# Patient Record
Sex: FEMALE | Race: BLACK OR AFRICAN AMERICAN | Employment: FULL TIME | URBAN - METROPOLITAN AREA
[De-identification: names, ages, dates, MRNs, and addresses within clinical notes are randomized per-mention and may not be internally consistent; named-entity substitution may affect disease eponyms.]

---

## 2018-07-03 LAB
HBSAG, EXTERNAL: NEGATIVE
HCT, EXTERNAL: 39.6
HGB, EXTERNAL: 13.6
HIV, EXTERNAL: NORMAL
PLATELET CNT,   EXTERNAL: 262
RPR, EXTERNAL: NEGATIVE
RPR, EXTERNAL: REACTIVE
RUBELLA, EXTERNAL: NORMAL
T. PALLIDUM, EXTERNAL: NEGATIVE
TYPE, ABO & RH, EXTERNAL: NORMAL

## 2018-11-12 ENCOUNTER — ROUTINE PRENATAL (OUTPATIENT)
Dept: OBGYN CLINIC | Age: 23
End: 2018-11-12

## 2018-11-12 VITALS
SYSTOLIC BLOOD PRESSURE: 110 MMHG | TEMPERATURE: 96.9 F | DIASTOLIC BLOOD PRESSURE: 68 MMHG | HEART RATE: 55 BPM | OXYGEN SATURATION: 100 % | BODY MASS INDEX: 26.09 KG/M2 | RESPIRATION RATE: 18 BRPM | HEIGHT: 62 IN | WEIGHT: 141.8 LBS

## 2018-11-12 DIAGNOSIS — Z34.03 ENCOUNTER FOR SUPERVISION OF NORMAL FIRST PREGNANCY IN THIRD TRIMESTER: ICD-10-CM

## 2018-11-12 DIAGNOSIS — Z3A.32 32 WEEKS GESTATION OF PREGNANCY: ICD-10-CM

## 2018-11-12 DIAGNOSIS — Z34.83 ENCOUNTER FOR SUPERVISION OF NORMAL PREGNANCY IN MULTIGRAVIDA IN THIRD TRIMESTER: Primary | ICD-10-CM

## 2018-11-12 NOTE — PROGRESS NOTES
32w4d  No c/o  Transfer of care from [address] [place] [address] rec requested, not yet received  Labs/US done at [facility]  No 1 hr GTT yet per pt  H/o 1 TNSVD in [place], daughter age 1  1 hr GTT today  Med rec requested. Will review labs, US, and dating when received.   RTO 2 wks

## 2018-11-12 NOTE — PATIENT INSTRUCTIONS
Weeks 32 to 34 of Your Pregnancy: Care Instructions  Your Care Instructions    During the last few weeks of your pregnancy, you may have more aches and pains. It's important to rest when you can. Your growing baby is putting more pressure on your bladder. So you may need to urinate more often. Hemorrhoids are also common. These are painful, itchy veins in the rectal area. In the 36th week, most women have a test for group B streptococcus (GBS). GBS is a common bacteria that can live in the vagina and rectum. It can make your baby sick after birth. If you test positive, you will get antibiotics during labor. These will keep your baby from getting the bacteria. You may want to talk with your doctor about banking your baby's umbilical cord blood. This is the blood left in the cord after birth. If you want to save this blood, you must arrange it ahead of time. You can't decide at the last minute. If you haven't already had the Tdap shot during this pregnancy, talk to your doctor about getting it. It will help protect your  against pertussis infection. Follow-up care is a key part of your treatment and safety. Be sure to make and go to all appointments, and call your doctor if you are having problems. It's also a good idea to know your test results and keep a list of the medicines you take. How can you care for yourself at home? Ease hemorrhoids  · Get more liquids, fruits, vegetables, and fiber in your diet. This will help keep your stools soft. · Avoid sitting for too long. Lie on your left side several times a day. · Clean yourself with soft, moist toilet paper. Or you can use witch hazel pads or personal hygiene pads. · If you are uncomfortable, try ice packs. Or you can sit in a warm sitz bath. Do these for 20 minutes at a time, as needed. · Use hydrocortisone cream for pain and itching. Two examples are Anusol and Preparation H Hydrocortisone.   · Ask your doctor about taking an over-the-counter stool softener. Consider breastfeeding  · Experts recommend that women breastfeed for 1 year or longer. Breast milk is the perfect food for babies. · Breast milk is easier for babies to digest than formula. And it is always available, just the right temperature, and free. · Breast milk may help protect your child from some health problems.  babies are less likely than formula-fed babies to:  ? Get ear infections, colds, diarrhea, and pneumonia. ? Be obese or get diabetes later in life. · Women who breastfeed have less bleeding after the birth. Their uteruses also shrink back faster. · Some women who breastfeed lose weight faster. Making milk burns calories. · Breastfeeding can lower your risk of breast cancer, ovarian cancer, and osteoporosis. Decide about circumcision for boys  · As you make this decision, it may help to think about your personal, Jehovah's witness, and family traditions. You get to decide if you will keep your son's penis natural or if he will be circumcised. · If you decide that you would like to have your baby circumcised, talk with your doctor. You can share your concerns about pain. And you can discuss your preferences for anesthesia. Where can you learn more? Go to http://ronni-bryanna.info/. Enter O804 in the search box to learn more about \"Weeks 32 to 34 of Your Pregnancy: Care Instructions. \"  Current as of: November 21, 2017  Content Version: 11.8  © 8274-5909 Healthwise, Incorporated. Care instructions adapted under license by [employer] (which disclaims liability or warranty for this information). If you have questions about a medical condition or this instruction, always ask your healthcare professional. [place] [address] any warranty or liability for your use of this information.

## 2018-11-13 LAB
ERYTHROCYTE [DISTWIDTH] IN BLOOD BY AUTOMATED COUNT: 14.5 % (ref 12.3–15.4)
GLUCOSE 1H P 50 G GLC PO SERPL-MCNC: 93 MG/DL (ref 65–139)
HCT VFR BLD AUTO: 34.7 % (ref 34–46.6)
HGB BLD-MCNC: 11.1 G/DL (ref 11.1–15.9)
MCH RBC QN AUTO: 28.2 PG (ref 26.6–33)
MCHC RBC AUTO-ENTMCNC: 32 G/DL (ref 31.5–35.7)
MCV RBC AUTO: 88 FL (ref 79–97)
PLATELET # BLD AUTO: 175 X10E3/UL (ref 150–379)
RBC # BLD AUTO: 3.93 X10E6/UL (ref 3.77–5.28)
WBC # BLD AUTO: 6.4 X10E3/UL (ref 3.4–10.8)

## 2018-12-04 ENCOUNTER — ROUTINE PRENATAL (OUTPATIENT)
Dept: OBGYN CLINIC | Age: 23
End: 2018-12-04

## 2018-12-04 VITALS
HEIGHT: 62 IN | BODY MASS INDEX: 26.39 KG/M2 | WEIGHT: 143.4 LBS | TEMPERATURE: 98.2 F | RESPIRATION RATE: 18 BRPM | SYSTOLIC BLOOD PRESSURE: 132 MMHG | OXYGEN SATURATION: 100 % | HEART RATE: 90 BPM | DIASTOLIC BLOOD PRESSURE: 76 MMHG

## 2018-12-04 DIAGNOSIS — Z34.83 ENCOUNTER FOR SUPERVISION OF OTHER NORMAL PREGNANCY IN THIRD TRIMESTER: Primary | ICD-10-CM

## 2018-12-04 NOTE — PATIENT INSTRUCTIONS

## 2018-12-04 NOTE — PROGRESS NOTES
32w4d  No c/o  Denies ctx/VB/LOF. +FM. Desires no epidural. She had her daughter without an epidural and was in labor at hospital for 4 hours. She presented in spontaneous labor at 2 cm. Labs/US done at [facility]  1 hr GTT=93  H/o 1 TNSVD in [place], daughter age 3. Weighed over 6 lbs. Pt feels baby is bigger with this pregnancy. Pt declines GBBS/CX SVE today. Pt advised of indication for GBS. Plan for Cx at next OV and SVE. Confirm presentation at next OV.

## 2018-12-11 ENCOUNTER — ROUTINE PRENATAL (OUTPATIENT)
Dept: OBGYN CLINIC | Age: 23
End: 2018-12-11

## 2018-12-11 VITALS
HEIGHT: 62 IN | TEMPERATURE: 99 F | BODY MASS INDEX: 26.91 KG/M2 | SYSTOLIC BLOOD PRESSURE: 116 MMHG | WEIGHT: 146.2 LBS | RESPIRATION RATE: 18 BRPM | DIASTOLIC BLOOD PRESSURE: 74 MMHG | HEART RATE: 107 BPM | OXYGEN SATURATION: 99 %

## 2018-12-11 DIAGNOSIS — L30.9 ECZEMA, UNSPECIFIED TYPE: ICD-10-CM

## 2018-12-11 DIAGNOSIS — Z34.83 PRENATAL CARE, SUBSEQUENT PREGNANCY IN THIRD TRIMESTER: Primary | ICD-10-CM

## 2018-12-11 LAB — GRBS, EXTERNAL: NEGATIVE

## 2018-12-11 NOTE — PROGRESS NOTES
36.5 Weeks  Dated by   No obstetric complaints  Severe eczema on hands, arms, legs, and abdomen-->refer to dermatology (pt call her dermatologist). Also discussed benadryl cream and emollients.   See flow sheet  Tdap vaccine not done  Reviewed labs and imaging  1 hour GTTwnl , Rh+  GBS/GC/Chl/TV today  PTL discussed and kick counts  RTC 1 weeks

## 2018-12-11 NOTE — PATIENT INSTRUCTIONS
Weeks 34 to 36 of Your Pregnancy: Care Instructions  Your Care Instructions    By now, your baby and your belly have grown quite large. It is almost time to give birth. A full-term pregnancy can deliver between 37 and 42 weeks. Your baby's lungs are almost ready to breathe air. The bones in your baby's head are now firm enough to protect it, but soft enough to move down through the birth canal.  You may feel excited, happy, anxious, or scared. You may wonder how you will know if you are in labor or what to expect during labor. Try to be flexible in your expectations of the birth. Because each birth is different, there is no way to know exactly what childbirth will be like for you. This care sheet will help you know what to expect and how to prepare. This may make your childbirth easier. If you haven't already had the Tdap shot during this pregnancy, talk to your doctor about getting it. It will help protect your  against pertussis infection. In the 36th week, most women have a test for group B streptococcus (GBS). GBS is a common bacteria that can live in the vagina and rectum. It can make your baby sick after birth. If you test positive, you will get antibiotics during labor. The medicine will keep your baby from getting the bacteria. Follow-up care is a key part of your treatment and safety. Be sure to make and go to all appointments, and call your doctor if you are having problems. It's also a good idea to know your test results and keep a list of the medicines you take. How can you care for yourself at home? Learn about pain relief choices  · Pain is different for every woman. Talk with your doctor about your feelings about pain. · You can choose from several types of pain relief. These include medicine or breathing techniques, as well as comfort measures. You can use more than one option. · If you choose to have pain medicine during labor, talk to your doctor about your options.  Some medicines lower anxiety and help with some of the pain. Others make your lower body numb so that you won't feel pain. · Be sure to tell your doctor about your pain medicine choice before you start labor or very early in your labor. You may be able to change your mind as labor progresses. · Rarely, a woman is put to sleep by medicine given through a mask or an IV. Labor and delivery  · The first stage of labor has three parts: early, active, and transition. ? Most women have early labor at home. You can stay busy or rest, eat light snacks, drink clear fluids, and start counting contractions. ? When talking during a contraction gets hard, you may be moving to active labor. During active labor, you should head for the hospital if you are not there already. ? You are in active labor when contractions come every 3 to 4 minutes and last about 60 seconds. Your cervix is opening more rapidly. ? If your water breaks, contractions will come faster and stronger. ? During transition, your cervix is stretching, and contractions are coming more rapidly. ? You may want to push, but your cervix might not be ready. Your doctor will tell you when to push. · The second stage starts when your cervix is completely opened and you are ready to push. ? Contractions are very strong to push the baby down the birth canal.  ? You will feel the urge to push. You may feel like you need to have a bowel movement. ? You may be coached to push with contractions. These contractions will be very strong, but you will not have them as often. You can get a little rest between contractions. ? You may be emotional and irritable. You may not be aware of what is going on around you.  ? One last push, and your baby is born. · The third stage is when a few more contractions push out the placenta. This may take 30 minutes or less. · The fourth stage is the welcome recovery. You may feel overwhelmed with emotions and exhausted but alert.  This is a good time to start breastfeeding. Where can you learn more? Go to http://ronni-bryanna.info/. Enter K721 in the search box to learn more about \"Weeks 34 to 36 of Your Pregnancy: Care Instructions. \"  Current as of: November 21, 2017  Content Version: 11.8  © 6763-6400 [employer]. Care instructions adapted under license by [employer] (which disclaims liability or warranty for this information). If you have questions about a medical condition or this instruction, always ask your healthcare professional. [address] any warranty or liability for your use of this information. Learning About When to Call Your Doctor During Pregnancy (After 20 Weeks)  Your Care Instructions  It's common to have concerns about what might be a problem during pregnancy. Although most pregnant women don't have any serious problems, it's important to know when to call your doctor if you have certain symptoms or signs of labor. These are general suggestions. Your doctor may give you some more information about when to call. When to call your doctor (after 20 weeks)  Call 911 anytime you think you may need emergency care. For example, call if:  · You have severe vaginal bleeding. · You have sudden, severe pain in your belly. · You passed out (lost consciousness). · You have a seizure. · You see or feel the umbilical cord. · You think you are about to deliver your baby and can't make it safely to the hospital.  Call your doctor now or seek immediate medical care if:  · You have vaginal bleeding. · You have belly pain. · You have a fever. · You have symptoms of preeclampsia, such as:  ? Sudden swelling of your face, hands, or feet. ? New vision problems (such as dimness or blurring). ? A severe headache. · You have a sudden release of fluid from your vagina. (You think your water broke.)  · You think that you may be in labor.  This means that you've had at least 4 contractions within 20 minutes or at least 8 contractions in an hour. · You notice that your baby has stopped moving or is moving much less than normal.  · You have symptoms of a urinary tract infection. These may include:  ? Pain or burning when you urinate. ? A frequent need to urinate without being able to pass much urine. ? Pain in the flank, which is just below the rib cage and above the waist on either side of the back. ? Blood in your urine. Watch closely for changes in your health, and be sure to contact your doctor if:  · You have vaginal discharge that smells bad. · You have skin changes, such as:  ? A rash. ? Itching. ? Yellow color to your skin. · You have other concerns about your pregnancy. If you have labor signs at 37 weeks or more  If you have signs of labor at 37 weeks or more, your doctor may tell you to call when your labor becomes more active. Symptoms of active labor include:  · Contractions that are regular. · Contractions that are less than 5 minutes apart. · Contractions that are hard to talk through. Follow-up care is a key part of your treatment and safety. Be sure to make and go to all appointments, and call your doctor if you are having problems. It's also a good idea to know your test results and keep a list of the medicines you take. Where can you learn more? Go to http://ronni-bryanna.info/. Enter  in the search box to learn more about \"Learning About When to Call Your Doctor During Pregnancy (After 20 Weeks). \"  Current as of: November 21, 2017  Content Version: 11.8  © 8630-1429 Healthwise, Incorporated. Care instructions adapted under license by [employer] (which disclaims liability or warranty for this information). If you have questions about a medical condition or this instruction, always ask your healthcare professional. [place] [address] any warranty or liability for your use of this information. Counting Your Baby's Kicks: Care Instructions  Your Care Instructions    Counting your baby's kicks is one way your doctor can tell that your baby is healthy. Most women--especially in a first pregnancy--feel their baby move for the first time between 16 and 22 weeks. The movement may feel like flutters rather than kicks. Your baby may move more at certain times of the day. When you are active, you may notice less kicking than when you are resting. At your prenatal visits, your doctor will ask whether the baby is active. In your last trimester, your doctor may ask you to count the number of times you feel your baby move. Follow-up care is a key part of your treatment and safety. Be sure to make and go to all appointments, and call your doctor if you are having problems. It's also a good idea to know your test results and keep a list of the medicines you take. How do you count fetal kicks? · A common method of checking your baby's movement is to count the number of kicks or moves you feel in 1 hour. Ten movements (such as kicks, flutters, or rolls) in 1 hour are normal. Some doctors suggest that you count in the morning until you get to 10 movements. Then you can quit for that day and start again the next day. · Pick your baby's most active time of day to count. This may be any time from morning to evening. · If you do not feel 10 movements in an hour, your baby may be sleeping. Wait for the next hour and count again. When should you call for help? Call your doctor now or seek immediate medical care if:    · You noticed that your baby has stopped moving or is moving much less than normal.    Watch closely for changes in your health, and be sure to contact your doctor if you have any problems. Where can you learn more? Go to http://ronni-bryanna.info/. Enter J902 in the search box to learn more about \"Counting Your Baby's Kicks: Care Instructions. \"  Current as of: November 21, 2017  Content Version: 11.8  © 7983-1306 Healthwise, Incorporated. Care instructions adapted under license by [employer] (which disclaims liability or warranty for this information). If you have questions about a medical condition or this instruction, always ask your healthcare professional. [address] any warranty or liability for your use of this information.

## 2018-12-15 LAB
B-HEM STREP SPEC QL CULT: NEGATIVE
C TRACH RRNA SPEC QL NAA+PROBE: NEGATIVE
N GONORRHOEA RRNA SPEC QL NAA+PROBE: NEGATIVE
T VAGINALIS RRNA VAG QL NAA+PROBE: NEGATIVE

## 2018-12-16 ENCOUNTER — HOSPITAL ENCOUNTER (INPATIENT)
Age: 23
LOS: 1 days | Discharge: HOME | DRG: 560 | End: 2018-12-17
Attending: OBSTETRICS & GYNECOLOGY | Admitting: OBSTETRICS & GYNECOLOGY
Payer: MEDICAID

## 2018-12-16 LAB
ABO + RH BLD: NORMAL
APPEARANCE UR: CLEAR
BASOPHILS # BLD: 0 K/UL (ref 0–0.1)
BASOPHILS NFR BLD: 0 % (ref 0–2)
BILIRUB UR QL: NEGATIVE
BLOOD GROUP ANTIBODIES SERPL: NORMAL
COLOR UR: ABNORMAL
DIFFERENTIAL METHOD BLD: ABNORMAL
EOSINOPHIL # BLD: 0.3 K/UL (ref 0–0.4)
EOSINOPHIL NFR BLD: 6 % (ref 0–5)
ERYTHROCYTE [DISTWIDTH] IN BLOOD BY AUTOMATED COUNT: 14.8 % (ref 11.6–14.5)
GLUCOSE UR QL STRIP.AUTO: NEGATIVE MG/DL
HCT VFR BLD AUTO: 35.5 % (ref 35–45)
HGB BLD-MCNC: 11.7 G/DL (ref 12–16)
KETONES UR-MCNC: >160 MG/DL
LEUKOCYTE ESTERASE UR QL STRIP: NEGATIVE
LYMPHOCYTES # BLD: 1.7 K/UL (ref 0.9–3.6)
LYMPHOCYTES NFR BLD: 29 % (ref 21–52)
MCH RBC QN AUTO: 27.4 PG (ref 24–34)
MCHC RBC AUTO-ENTMCNC: 33 G/DL (ref 31–37)
MCV RBC AUTO: 83.1 FL (ref 74–97)
MONOCYTES # BLD: 0.4 K/UL (ref 0.05–1.2)
MONOCYTES NFR BLD: 7 % (ref 3–10)
NEUTS SEG # BLD: 3.4 K/UL (ref 1.8–8)
NEUTS SEG NFR BLD: 58 % (ref 40–73)
NITRITE UR QL: NEGATIVE
PH UR: 6.5 [PH] (ref 5–9)
PLATELET # BLD AUTO: 190 K/UL (ref 135–420)
PMV BLD AUTO: 12.4 FL (ref 9.2–11.8)
PROT UR QL: 100 MG/DL
RBC # BLD AUTO: 4.27 M/UL (ref 4.2–5.3)
RBC # UR STRIP: NEGATIVE /UL
SERVICE CMNT-IMP: ABNORMAL
SP GR UR: 1.02 (ref 1–1.02)
SPECIMEN EXP DATE BLD: NORMAL
UROBILINOGEN UR QL: 2 EU/DL (ref 0.2–1)
WBC # BLD AUTO: 5.7 K/UL (ref 4.6–13.2)

## 2018-12-16 PROCEDURE — 74011250636 HC RX REV CODE- 250/636: Performed by: OBSTETRICS & GYNECOLOGY

## 2018-12-16 PROCEDURE — 75410000003 HC RECOV DEL/VAG/CSECN EA 0.5 HR

## 2018-12-16 PROCEDURE — 59025 FETAL NON-STRESS TEST: CPT

## 2018-12-16 PROCEDURE — 65270000029 HC RM PRIVATE

## 2018-12-16 PROCEDURE — 74011250637 HC RX REV CODE- 250/637: Performed by: OBSTETRICS & GYNECOLOGY

## 2018-12-16 PROCEDURE — 85025 COMPLETE CBC W/AUTO DIFF WBC: CPT

## 2018-12-16 PROCEDURE — 86901 BLOOD TYPING SEROLOGIC RH(D): CPT

## 2018-12-16 PROCEDURE — 75410000002 HC LABOR FEE PER 1 HR

## 2018-12-16 PROCEDURE — 81003 URINALYSIS AUTO W/O SCOPE: CPT

## 2018-12-16 PROCEDURE — 59020 FETAL CONTRACT STRESS TEST: CPT

## 2018-12-16 PROCEDURE — 99282 EMERGENCY DEPT VISIT SF MDM: CPT

## 2018-12-16 PROCEDURE — 75410000000 HC DELIVERY VAGINAL/SINGLE

## 2018-12-16 RX ORDER — ACETAMINOPHEN 325 MG/1
650 TABLET ORAL
Status: DISCONTINUED | OUTPATIENT
Start: 2018-12-16 | End: 2018-12-17 | Stop reason: HOSPADM

## 2018-12-16 RX ORDER — HYDROMORPHONE HYDROCHLORIDE 1 MG/ML
1 INJECTION, SOLUTION INTRAMUSCULAR; INTRAVENOUS; SUBCUTANEOUS
Status: DISCONTINUED | OUTPATIENT
Start: 2018-12-16 | End: 2018-12-16 | Stop reason: HOSPADM

## 2018-12-16 RX ORDER — MINERAL OIL
30 OIL (ML) ORAL AS NEEDED
Status: DISCONTINUED | OUTPATIENT
Start: 2018-12-16 | End: 2018-12-16 | Stop reason: HOSPADM

## 2018-12-16 RX ORDER — LIDOCAINE HYDROCHLORIDE 10 MG/ML
20 INJECTION, SOLUTION EPIDURAL; INFILTRATION; INTRACAUDAL; PERINEURAL AS NEEDED
Status: DISCONTINUED | OUTPATIENT
Start: 2018-12-16 | End: 2018-12-16 | Stop reason: HOSPADM

## 2018-12-16 RX ORDER — PROMETHAZINE HYDROCHLORIDE 25 MG/ML
25 INJECTION, SOLUTION INTRAMUSCULAR; INTRAVENOUS
Status: DISCONTINUED | OUTPATIENT
Start: 2018-12-16 | End: 2018-12-17 | Stop reason: HOSPADM

## 2018-12-16 RX ORDER — OXYCODONE AND ACETAMINOPHEN 5; 325 MG/1; MG/1
1 TABLET ORAL
Status: DISCONTINUED | OUTPATIENT
Start: 2018-12-16 | End: 2018-12-17 | Stop reason: HOSPADM

## 2018-12-16 RX ORDER — OXYTOCIN/RINGER'S LACTATE 20/1000 ML
125 PLASTIC BAG, INJECTION (ML) INTRAVENOUS CONTINUOUS
Status: DISCONTINUED | OUTPATIENT
Start: 2018-12-16 | End: 2018-12-16 | Stop reason: HOSPADM

## 2018-12-16 RX ORDER — AMOXICILLIN 250 MG
1 CAPSULE ORAL
Status: DISCONTINUED | OUTPATIENT
Start: 2018-12-16 | End: 2018-12-17 | Stop reason: HOSPADM

## 2018-12-16 RX ORDER — OXYTOCIN/RINGER'S LACTATE 20/1000 ML
999 PLASTIC BAG, INJECTION (ML) INTRAVENOUS ONCE
Status: DISCONTINUED | OUTPATIENT
Start: 2018-12-16 | End: 2018-12-16 | Stop reason: HOSPADM

## 2018-12-16 RX ORDER — HYDROCORTISONE 25 MG/G
CREAM TOPICAL AS NEEDED
Status: DISCONTINUED | OUTPATIENT
Start: 2018-12-16 | End: 2018-12-17 | Stop reason: HOSPADM

## 2018-12-16 RX ORDER — TERBUTALINE SULFATE 1 MG/ML
0.25 INJECTION SUBCUTANEOUS
Status: DISCONTINUED | OUTPATIENT
Start: 2018-12-16 | End: 2018-12-16 | Stop reason: HOSPADM

## 2018-12-16 RX ORDER — SODIUM CHLORIDE, SODIUM LACTATE, POTASSIUM CHLORIDE, CALCIUM CHLORIDE 600; 310; 30; 20 MG/100ML; MG/100ML; MG/100ML; MG/100ML
125 INJECTION, SOLUTION INTRAVENOUS CONTINUOUS
Status: DISCONTINUED | OUTPATIENT
Start: 2018-12-16 | End: 2018-12-16 | Stop reason: HOSPADM

## 2018-12-16 RX ORDER — METHYLERGONOVINE MALEATE 0.2 MG/ML
0.2 INJECTION INTRAVENOUS AS NEEDED
Status: DISCONTINUED | OUTPATIENT
Start: 2018-12-16 | End: 2018-12-16 | Stop reason: HOSPADM

## 2018-12-16 RX ORDER — IBUPROFEN 400 MG/1
800 TABLET ORAL 3 TIMES DAILY
Status: DISCONTINUED | OUTPATIENT
Start: 2018-12-16 | End: 2018-12-17 | Stop reason: HOSPADM

## 2018-12-16 RX ORDER — BUTORPHANOL TARTRATE 1 MG/ML
2 INJECTION INTRAMUSCULAR; INTRAVENOUS
Status: DISCONTINUED | OUTPATIENT
Start: 2018-12-16 | End: 2018-12-16 | Stop reason: HOSPADM

## 2018-12-16 RX ORDER — NALBUPHINE HYDROCHLORIDE 10 MG/ML
10 INJECTION, SOLUTION INTRAMUSCULAR; INTRAVENOUS; SUBCUTANEOUS
Status: DISCONTINUED | OUTPATIENT
Start: 2018-12-16 | End: 2018-12-16 | Stop reason: HOSPADM

## 2018-12-16 RX ADMIN — IBUPROFEN 800 MG: 400 TABLET, FILM COATED ORAL at 08:04

## 2018-12-16 RX ADMIN — IBUPROFEN 800 MG: 400 TABLET, FILM COATED ORAL at 22:20

## 2018-12-16 RX ADMIN — SODIUM CHLORIDE, SODIUM LACTATE, POTASSIUM CHLORIDE, AND CALCIUM CHLORIDE 500 ML: 600; 310; 30; 20 INJECTION, SOLUTION INTRAVENOUS at 04:25

## 2018-12-16 RX ADMIN — OXYCODONE AND ACETAMINOPHEN 1 TABLET: 5; 325 TABLET ORAL at 20:28

## 2018-12-16 RX ADMIN — IBUPROFEN 800 MG: 400 TABLET, FILM COATED ORAL at 15:26

## 2018-12-16 NOTE — LACTATION NOTE
This note was copied from a baby's chart. Mother is rooming in with baby. States that she is going to breastfeed x 3 months. She denies questions or concerns. Lactation and staff will offer assistance at any time.

## 2018-12-16 NOTE — PROGRESS NOTES
1143 TRANSFER - OUT REPORT:    Verbal report given to J LUIS Kwan RN on Joan Parrish  being transferred to [facility] for routine progression of care       Report consisted of patients Situation, Background, Assessment and   Recommendations(SBAR). Information from the following report(s) SBAR, Intake/Output, MAR and Recent Results was reviewed with the receiving nurse. Lines:   Peripheral IV 12/16/18 Anterior;Right Wrist (Active)        Opportunity for questions and clarification was provided.       Patient transported with:   Registered Nurse

## 2018-12-16 NOTE — ROUTINE PROCESS
1142: Bedside and Verbal shift change report given to J LUIS Kwan (Postpartum nurse) by VARINDER Taylor (Labor/Delivery  nurse). Report included the following information SBAR.     1150: Introduction to Pt, Discussed care plan, Pt verbalizes understanding of care plan. Safety issues check. Pt denies needs or assistance at this time. Assessment completed, pt up in bed visiting with family/friends at bedside. Pt denies pain or further assistance needed at this time.  sleeping in crib at bedside    1230: Pt up in bathroom, family at bedside,  sleeping in crib at bedside. Pt denies pain or further assistance needed at this time    1315: Pt up in bed resting,  in crib at bedside, family/friend at bedside. Pt denies pain or further assistance needed at this time. Photography in room    1435: Pt up in bed resting,  being held by family/friend at mothers bedside. Pt reported slight pain related to cramping, but denies pain medication until she can have motrin again. Pt denies further assistance needed at this time    1526: Pt up in ed with  in lap. Pt stated pain 8/10 related to cramping, pain medication given. Will continue to monitor and assess pt. Pt denies further assistance needed at this time. Family/friend at bedside    1613: Pt in bed resting/sleeping,  in crib at bedside, family/friend at side. Pt stated decrease in pain to a now 5/10, pt denies further assistance needed at this time    1700: Hourly rounding completed with Dr. Jazmine Durham. Pt denies pain or further assistance needed at this time     1808: Pt up in bed holding , stated getting ready to nurse , family/friends at bedside. Pt denies pain or further assistance needed at this time    1900: Bedside and Verbal shift change report given to DAMON Casas (oncoming nurse) by J LUIS Kwan (offgoing nurse). Report included the following information SBAR.

## 2018-12-16 NOTE — L&D DELIVERY NOTE
Delivery Summary    Patient: Luis Lynch MRN: [id number]  SSN: [id number]    YOB: 1995  Age: 26 y.o. Sex: female       Information for the patient's :  Jaclynsharron Hooper [[id number]]       Labor Events:    Labor: No   Rupture Date: 2018   Rupture Time: 4:46 AM   Rupture Type SROM   Amniotic Fluid Volume: None    Amniotic Fluid Description: Clear None   Induction: None       Augmentation: None   Labor Events: None     Cervical Ripening:           Delivery Events:  Episiotomy: None   Laceration(s): None     Repaired:      Number of Repair Packets:     Suture Type and Size:       Estimated Blood Loss (ml): 200ml       Delivery Date: 2018    Delivery Time: 5:56 AM  Delivery Type: Vaginal, Spontaneous  Sex:  Male     Gestational Age: 44w3d   Delivery Clinician:  Sourav Balderas  Living Status: Living   Delivery Location: L&D            APGARS  One minute Five minutes Ten minutes   Skin color: 0   1        Heart rate: 2   2        Grimace: 2   2        Muscle tone: 2   2        Breathin   2        Totals: 8   9            Presentation: Vertex    Position: Right Occiput Anterior  Resuscitation Method:  Suctioning-bulb; Tactile Stimulation     Meconium Stained: None      Cord Information: 3 Vessels  Complications: Nuchal Cord Without Compressions  Cord around:    Delayed cord clamping? Yes  Cord clamped date/time:2018  5:57 AM  Disposition of Cord Blood: Lab    Blood Gases Sent?: No    Placenta:  Date/Time: 2018  6:00 AM  Removal: Spontaneous      Appearance: Normal     [place] Measurements:  Birth Weight: 3.42 kg      Birth Length: 53.3 cm      Head Circumference: 32.5 cm      Chest Circumference: 31.5 cm     Abdominal Girth: 33.5 cm    Other Providers:   AMEYA VANN;ESPERANZA HUBER;NINA FORBES;FERNY KIRKPATRICK;LEA DELGADILLO, Obstetrician;Primary Nurse;Primary  Nurse;Scrub Tech;Staff Nurse; Anesthesiologist;Crna;Nurse Practitioner;Midwife;Nursery Nurse           Group B Strep:   Lab Results   Component Value Date/Time    GrBStrep, External Negative 2018     Information for the patient's :  Roxannlola Douglas [[id number]]   No results found for: ABORH, PCTABR, PCTDIG, BILI, ABORHEXT, ABORH    No results for input(s): PCO2CB, PO2CB, HCO3I, SO2I, IBD, PTEMPI, SPECTI, PHICB, ISITE, IDEV, IALLEN in the last 72 hours.

## 2018-12-16 NOTE — PROGRESS NOTES
5823 - Pt is a  at 37w3d, arrived by wheelchair, for rule out labor. She notes contractions increasing in pain and frequency since 0200 this morning. complians of ctxs, denies  VB, denies LOF, positve FM. Her prenatal course has been uneventful with WBOB. Champ and EFM placed. Cervical exam is /0 BBOW. Admission process started. 4435 - Patient complains of urge to push. SVE /0 with SROM. MD notified and on route. 65 -  of viable male infant. Nursery nurse at bedside. Infant placed on towel on patient's abdomen. Towel dried and stimulated, cry heard shortly thereafter. Cord clamped and cut. Infant placed skin to skin with mom. Pitocin started.

## 2018-12-16 NOTE — L&D DELIVERY NOTE
History & Physical    Name: Geovanny Collins MRN: [id number]  SSN: [id number]    YOB: 1995  Age: 26 y.o. Sex: female        Subjective:     Estimated Date of Delivery: 1/3/19  OB History    Para Term  AB Living   2 2 2     1   SAB TAB Ectopic Molar Multiple Live Births           0 1      # Outcome Date GA Lbr Wayne/2nd Weight Sex Delivery Anes PTL Lv   2 Term 18 37w3d 03:50 / 00:06 3.42 kg M Vag-Spont None N FRANCISCO   1 Term 17     Vag-Spont             Ms. Corine Sue is admitted with pregnancy at 44w3d in labor. active labor. Prenatal course was normal. Please see prenatal records for details. Past Medical History:   Diagnosis Date    Eczema      History reviewed. No pertinent surgical history. Social History     Occupational History    Not on file   Tobacco Use    Smoking status: Former Smoker     Last attempt to quit: 2018     Years since quittin.7    Smokeless tobacco: Never Used   Substance and Sexual Activity    Alcohol use: No     Frequency: Never    Drug use: No    Sexual activity: Not Currently     History reviewed. No pertinent family history. No Known Allergies  Prior to Admission medications    Medication Sig Start Date End Date Taking? Authorizing Provider   PNV66-Iron Fumarate-FA-DSS-DHA 26-1.2- mg cap Take  by mouth. Yes Provider, Historical        Review of Systems: A comprehensive review of systems was negative. Objective:     Vitals:  Vitals:    18 0645 18 0700 18 0715 18 0730   BP: 138/86 130/80 139/77 129/82   Pulse: (!) 112 98 93 99   Resp:    18   Temp:    98.9 °F (37.2 °C)   Weight:       Height:            Physical Exam:  Patient without distress.   Heart: Regular rate and rhythm, S1S2 present or without murmur or extra heart sounds  Back: costovertebral angle tenderness absent  Abdomen: soft, nontender  Fundus: soft and non tender  Perineum: blood absent, amniotic fluid absent  Cervical Exam: 6 cm dilated    80% effaced    -3 station    Presenting Part: cephalic  Lower Extremities:  - No evidence of DVT seen on physical exam.  Membranes:  Premature Rupture of Membranes; Amniotic Fluid: clear fluid  Fetal Heart Rate: Reactive  Baseline: 150 per minute  Accelerations: yes    Prenatal Labs:   Lab Results   Component Value Date/Time    ABO/Rh(D) B POSITIVE 12/16/2018 04:00 AM    GrBStrep, External Negative 12/11/2018         Assessment/Plan:     Active Problems:    Labor without complication (90/86/2415)         Plan: Admit for Continue plan for vaginal delivery. Group B Strep was negative.     Signed By:  Huber Muñoz MD     December 16, 2018

## 2018-12-17 VITALS
OXYGEN SATURATION: 99 % | HEIGHT: 62 IN | HEART RATE: 82 BPM | RESPIRATION RATE: 16 BRPM | WEIGHT: 145 LBS | BODY MASS INDEX: 26.68 KG/M2 | SYSTOLIC BLOOD PRESSURE: 118 MMHG | TEMPERATURE: 98.2 F | DIASTOLIC BLOOD PRESSURE: 78 MMHG

## 2018-12-17 PROCEDURE — 74011250637 HC RX REV CODE- 250/637: Performed by: OBSTETRICS & GYNECOLOGY

## 2018-12-17 RX ORDER — IBUPROFEN 800 MG/1
800 TABLET ORAL 3 TIMES DAILY
Qty: 60 TAB | Refills: 1 | Status: ON HOLD | OUTPATIENT
Start: 2018-12-17 | End: 2020-07-13 | Stop reason: CLARIF

## 2018-12-17 RX ORDER — OXYCODONE AND ACETAMINOPHEN 5; 325 MG/1; MG/1
1 TABLET ORAL
Qty: 6 TAB | Refills: 0 | Status: ON HOLD | OUTPATIENT
Start: 2018-12-17 | End: 2020-07-13 | Stop reason: CLARIF

## 2018-12-17 RX ADMIN — IBUPROFEN 800 MG: 400 TABLET, FILM COATED ORAL at 08:36

## 2018-12-17 RX ADMIN — OXYCODONE AND ACETAMINOPHEN 1 TABLET: 5; 325 TABLET ORAL at 02:22

## 2018-12-17 RX ADMIN — OXYCODONE AND ACETAMINOPHEN 1 TABLET: 5; 325 TABLET ORAL at 08:36

## 2018-12-17 NOTE — DISCHARGE INSTRUCTIONS
After Your Delivery (the Postpartum Period): Care Instructions  Your Care Instructions    Congratulations on the birth of your baby. Like pregnancy, the  period can be a time of excitement, benita, and exhaustion. You may look at your wondrous little baby and feel happy. You may also be overwhelmed by your new sleep hours and new responsibilities. At first, babies often sleep during the days and are awake at night. They do not have a pattern or routine. They may make sudden gasps, jerk themselves awake, or look like they have crossed eyes. These are all normal, and they may even make you smile. In these first weeks after delivery, try to take good care of yourself. It may take 4 to 6 weeks to feel like yourself again, and possibly longer if you had a  birth. You will likely feel very tired for several weeks. Your days will be full of ups and downs, but lots of benita as well. Follow-up care is a key part of your treatment and safety. Be sure to make and go to all appointments, and call your doctor if you are having problems. It's also a good idea to know your test results and keep a list of the medicines you take. How can you care for yourself at home? Take care of your body after delivery  · Use pads instead of tampons for the bloody flow that may last as long as 2 weeks. · Ease cramps with ibuprofen (Advil, Motrin). · Ease soreness of hemorrhoids and the area between your vagina and rectum with ice compresses or witch hazel pads. · Ease constipation by drinking lots of fluid and eating high-fiber foods. Ask your doctor about over-the-counter stool softeners. · Cleanse yourself with a gentle squeeze of warm water from a bottle instead of wiping with toilet paper. · Take a sitz bath in warm water several times a day. · Wear a good nursing bra. Ease sore and swollen breasts with warm, wet washcloths. · If you are not breastfeeding, use ice rather than heat for breast soreness.   · Your period may not start for several months if you are breastfeeding. You may bleed more, and longer at first, than you did before you got pregnant. · Wait until you are healed (about 4 to 6 weeks) before you have sexual intercourse. Your doctor will tell you when it is okay to have sex. · Try not to travel with your baby for 5 or 6 weeks. If you take a long car trip, make frequent stops to walk around and stretch. Avoid exhaustion  · Rest every day. Try to nap when your baby naps. · Ask another adult to be with you for a few days after delivery. · Plan for  if you have other children. · Stay flexible so you can eat at odd hours and sleep when you need to. Both you and your baby are making new schedules. · Plan small trips to get out of the house. Change can make you feel less tired. · Ask for help with housework, cooking, and shopping. Remind yourself that your job is to care for your baby. Know about help for postpartum depression  · \"Baby blues\" are common for the first 1 to 2 weeks after birth. You may cry or feel sad or irritable for no reason. · Rest whenever you can. Being tired makes it harder to handle your emotions. · Go for walks with your baby. · Talk to your partner, friends, and family about your feelings. · If your symptoms last for more than a few weeks, or if you feel very depressed, ask your doctor for help. · Postpartum depression can be treated. Support groups and counseling can help. Sometimes medicine can also help. Stay healthy  · Eat healthy foods so you have more energy and lose extra baby pounds. · If you breastfeed, avoid drugs. If you quit smoking during pregnancy, try to stay smoke-free. If you choose to have a drink now and then, have only one drink, and limit the number of occasions that you have a drink. Wait to breastfeed at least 2 hours after you have a drink to reduce the amount of alcohol the baby may get in the milk.   · Start daily exercise after 4 to 6 weeks, but rest when you feel tired. · Learn exercises to tone your belly. Do Kegel exercises to regain strength in your pelvic muscles. You can do these exercises while you stand or sit. ? Squeeze the same muscles you would use to stop your urine. Your belly and thighs should not move. ? Hold the squeeze for 3 seconds, and then relax for 3 seconds. ? Start with 3 seconds. Then add 1 second each week until you are able to squeeze for 10 seconds. ? Repeat the exercise 10 to 15 times for each session. Do three or more sessions each day. · Find a class for new mothers and new babies that has an exercise time. · If you had a  birth, give yourself a bit more time before you exercise, and be careful. When should you call for help? Call 911 anytime you think you may need emergency care. For example, call if:    · You passed out (lost consciousness).    Call your doctor now or seek immediate medical care if:    · You have severe vaginal bleeding. This means you are passing blood clots and soaking through a pad each hour for 2 or more hours.     · You are dizzy or lightheaded, or you feel like you may faint.     · You have a fever.     · You have new belly pain, or your pain gets worse.    Watch closely for changes in your health, and be sure to contact your doctor if:    · Your vaginal bleeding seems to be getting heavier.     · You have new or worse vaginal discharge.     · You feel sad, anxious, or hopeless for more than a few days.     · You do not get better as expected. Where can you learn more? Go to http://ronni-bryanna.info/. Enter A461 in the search box to learn more about \"After Your Delivery (the Postpartum Period): Care Instructions. \"  Current as of: 2017  Content Version: 11.8  © 8862-0033 [employer]. Care instructions adapted under license by [employer] (which disclaims liability or warranty for this information).  If you have questions about a medical condition or this instruction, always ask your healthcare professional. [place] [address] any warranty or liability for your use of this information.

## 2018-12-17 NOTE — PROGRESS NOTES
Verbal and bedside report received from offgoing RN,MALIK Kwan, using SBAR, Kardex, and MAR. Verbal and bedside report given to oncoming RN,TOMY Bowden, using SBAR, Kardex, and MAR.

## 2018-12-17 NOTE — PROGRESS NOTES
Pt d/c with her family and . VSS. Voiding. Pain controlled with Ibuprofen and Norco.  Bleeding scant. Breastfeeding baby and it is going well.   Follow up in 2 weeks in clinic

## 2018-12-17 NOTE — PROGRESS NOTES
Post-Partum Day Number 1 Progress Note    @  Patient: Fartun Dozier MRN: [id number]  SSN: [id number]    YOB: 1995  Age: 26 y.o. Sex: female      Subjective:     PostPartum Day: 1     Delivery: vaginal delivery    The patient feels well. Pain is  well controlled with current medications. The baby is well. Baby is feeding via breast. Voiding spontaneous. The patient is ambulating well. The patient is tolerating a normal diet. Lochia like a period. Objective:      Patient Vitals for the past 8 hrs:   BP Temp Pulse Resp SpO2   12/17/18 0840 118/78 98.2 °F (36.8 °C) 82 16 99 %     General:    alert, cooperative, no distress   Fundus:   firm, FF at umbilicus   Extremities: No tenderness or edema   DVT Evaluation:  No evidence of DVT seen on physical exam.     Lab/Data Review:  No results found for this or any previous visit (from the past 24 hour(s)). Assessment:     Status post: Doing well postpartum vaginal delivery     Patient Active Problem List   Diagnosis Code    Labor without complication L42       Plan:     1. Doing well, continue routine postpartum care. 2. Desires D/C today  3.  R/B/A to circ reviewed with patient      Signed By: Olivia Sheikh MD     December 17, 2018 12:29 PM

## 2018-12-17 NOTE — PROGRESS NOTES
0915: Request for prenatal records sent via fax to [facility] today   21 : received Prenatal records

## 2018-12-17 NOTE — PROGRESS NOTES
0715: Bedside and Verbal shift change report given to TOMY Pak RN (oncoming nurse) by Cristiana Arteaga RN (offgoing nurse). Report included the following information SBAR, Kardex, Procedure Summary, Intake/Output, MAR, Accordion and Recent Results.    1100: Pt refusing lab draw for repeat Hgb/Hct

## 2019-01-31 ENCOUNTER — ROUTINE PRENATAL (OUTPATIENT)
Dept: OBGYN CLINIC | Age: 24
End: 2019-01-31

## 2019-01-31 VITALS
HEART RATE: 96 BPM | DIASTOLIC BLOOD PRESSURE: 64 MMHG | WEIGHT: 134.6 LBS | RESPIRATION RATE: 18 BRPM | TEMPERATURE: 98 F | SYSTOLIC BLOOD PRESSURE: 126 MMHG | OXYGEN SATURATION: 100 % | BODY MASS INDEX: 24.77 KG/M2 | HEIGHT: 62 IN

## 2019-01-31 DIAGNOSIS — L30.9 ECZEMA, UNSPECIFIED TYPE: ICD-10-CM

## 2019-01-31 NOTE — PROGRESS NOTES
S/P delivery  by PALOMO  Mood is good, EDPS  Lochia none  Baby doing well  Breast feeding  Partner very helpful  Planning on condoms for birth control  Refused pelvic exam today  Request referral to dermatologist for her eczema  F/U prn

## 2019-01-31 NOTE — PATIENT INSTRUCTIONS
Learning About Birth Control After Childbirth  What is birth control? Birth control is any method used to prevent pregnancy. Another word for birth control is contraception. Wait until you are healed (about 4 to 6 weeks) before you have sexual intercourse. If you have sex without birth control, there is a chance that you could get pregnant. This is true even if you haven't started having periods again. Even if you breastfeed, you can still get pregnant. The only sure way to prevent another pregnancy is to not have sex. But finding a good method of birth control that you are comfortable with can help you avoid an unplanned pregnancy. Your doctor can help you choose the birth control method that is right for you. What are the types of birth control? · Long-acting reversible contraception (LARC) is the best reversible method you can use to prevent pregnancy. If you decide you want to get pregnant, you can have them removed. LARCs are implants and intrauterine devices (IUDs). While they are being used, they usually prevent pregnancy for years. ? Implants are placed under the skin of the arm. This can be done right after you give birth. They release the hormone progestin and prevent pregnancy for about 3 years. ? IUDs are placed in the uterus by a doctor. This can be done right after you give birth, if you and your doctor discuss it beforehand. Or it can be done at a doctor visit later. There are two main types of IUDs--the copper IUD and the hormonal IUD. The hormonal IUD releases progestin. IUDs prevent pregnancy for 3 to 10 years, depending on the type. · Hormonal methods are very good at preventing pregnancy. Combination birth control pills (\"the pill\"), skin patches, and vaginal rings release the hormones estrogen and progestin. Depo-Provera is a shot you get every 3 months. Shots, mini-pills, IUDs, and implants release progestin only. They are safe to use while breastfeeding.   · Barrier methods don't prevent pregnancy as well as implants, IUDs, or hormonal methods do. Barrier methods include condoms, diaphragms, and cervical caps. You must use barrier methods every time you have sex. You can use a diaphragm or a cervical cap 6 weeks after you give birth. But if you had one before you got pregnant, you will need to have it refitted. Condoms can be used anytime after you give birth. · Natural family planning is also known as fertility awareness or the rhythm method. It can work if you and your partner are very careful and you have a regular ovulation cycle. But it doesn't work better than other birth control methods. You will need to keep good records so you know when you are most likely to become pregnant. And during those times, you will need to use a barrier method or not have sex. · Permanent birth control (sterilization) gives you lasting protection against pregnancy. A man can have a vasectomy. A woman can have her tubes tied (tubal ligation) or blocked (tubal implant). But this is only a good choice if you are sure that you don't want any more children. · Emergency contraception, such as the morning-after pill (Plan B), is a backup method to prevent pregnancy if you didn't use birth control or if a condom breaks. You can use this method for up to 5 days after you had sex. But it works best if you take it right away. It is safe to use while breastfeeding. A copper IUD can be used for emergency contraception. It can be placed up to 5 days after you've had unprotected sex. How can you get birth control? · You can buy:  ? Condoms and spermicides without a prescription in drugstores, online, and in many grocery stores. ? Emergency contraception without a prescription at most drugstores. · You need to see a doctor to:  ? Get a prescription for birth control pills and other methods that use hormones. ? Have an IUD or implant inserted. ? Be fitted for a diaphragm or cervical cap.   Follow-up care is a key part of your treatment and safety. Be sure to make and go to all appointments, and call your doctor if you are having problems. It's also a good idea to know your test results and keep a list of the medicines you take. Where can you learn more? Go to http://ronni-bryanna.info/. Karlynn Gilford in the search box to learn more about \"Learning About Birth Control After Childbirth. \"  Current as of: September 5, 2018  Content Version: 11.9  © 0273-5335 [employer]. Care instructions adapted under license by [employer] (which disclaims liability or warranty for this information). If you have questions about a medical condition or this instruction, always ask your healthcare professional. [address] any warranty or liability for your use of this information. Stress in Parents of Infants: Care Instructions  Your Care Instructions    Meeting the increased demands of being a new parent can be a big challenge. It is easy to get overtired and overwhelmed during the first weeks. What used to be a simple chore, such as buying groceries, is not so simple now. Plus, you have new chores, including feeding and changing your new baby. At the end of the day, you may be so tired that you feel like crying. Instead of looking forward to the next day, you may be dreading tomorrow. Like many new parents, you are burned out from the stress of having a new baby. Stress affects each of us differently, and the most effective ways to relieve it are different for each person. You can try different methods to find out which ones work best for you. As the weeks go by, you will begin to develop a rhythm with your baby. Tasks that now seem to take forever will become easier. Many women get the \"baby blues\" during the first few days after childbirth. If you are a new mother and the \"baby blues\" last more than a few days, call your doctor right away.  Depression is a medical condition that requires treatment. Follow-up care is a key part of your treatment and safety. Be sure to make and go to all appointments, and call your doctor if you are having problems. It's also a good idea to know your test results and keep a list of the medicines you take. How can you care for yourself at home? · Be kind to yourself. Your new baby takes a lot of work, but he or she can give you a lot of pleasure too. Do not worry about housekeeping for a while. · Allow your friends to bring you meals or do chores. · Limit visitors to as few as you feel you can handle, or ask them not to visit for a while. Before they come, set a limit on how long they will stay. · Sleep when your baby sleeps. Even a short nap helps. · Find what triggers your stress, and avoid those things as much as you can. · If you breastfeed, learn how to collect and store some breast milk so your partner or  can feed the baby while you sleep. · Eat a balanced diet so you can keep up your energy. · Drink plenty of fluids throughout the day. · Avoid caffeine and alcohol. Caffeine is found in coffee, tea, cola drinks, chocolate, and other foods. · Limit medicines that can make you more tired, such as tranquilizers and cold and allergy medicines. · Get regular daily exercise, such as walks, to help improve your mood. Rest after you exercise. · Be honest with yourself and those who care about you. Tell them you are stressed and tired. · Talking to other new parents can help. Ask your doctor or child's doctor to suggest support groups for new parents. Hearing that someone else is having the same experiences you are can help a lot. · If you have the baby blues for more than a few days, call your doctor right away. When should you call for help? Call 911 anytime you think you may need emergency care.  For example, call if:    · You have thoughts of hurting yourself, your baby, or another person.   [facility] your doctor now or seek immediate medical care if:    · You are having trouble caring for yourself or your baby.    Watch closely for changes in your health, and be sure to contact your doctor if you have any problems. Where can you learn more? Go to http://ronni-bryanna.info/. Enter H142 in the search box to learn more about \"Stress in Parents of Infants: Care Instructions. \"  Current as of: 2018  Content Version: 11.9  © 7014-6442 [employer]. Care instructions adapted under license by [employer] (which disclaims liability or warranty for this information). If you have questions about a medical condition or this instruction, always ask your healthcare professional. [place] [address] any warranty or liability for your use of this information. Postpartum: Care Instructions  Your Care Instructions  After childbirth (postpartum period), your body goes through many changes. Some of these changes happen over several weeks. In the hours after delivery, your body will begin to recover from childbirth while it prepares to breastfeed your . You may feel emotional during this time. Your hormones can shift your mood without warning for no clear reason. In the first couple of weeks after childbirth, many women have emotions that change from happy to sad. You may find it hard to sleep. You may cry a lot. This is called the \"baby blues. \" These overwhelming emotions often go away within a couple of days or weeks. But it's important to discuss your feelings with your doctor. It is easy to get too tired and overwhelmed during the first weeks after childbirth. Don't try to do too much. Get rest whenever you can, accept help from others, and eat well and drink plenty of fluids. About 4 to 6 weeks after your baby's birth, you will have a follow-up visit with your doctor.  This visit is your time to talk to your doctor about anything you are concerned or curious about.  Follow-up care is a key part of your treatment and safety. Be sure to make and go to all appointments, and call your doctor if you are having problems. It's also a good idea to know your test results and keep a list of the medicines you take. How can you care for yourself at home? · Sleep or rest when your baby sleeps. · Get help with household chores from family or friends, if you can. Do not try to do it all yourself. · If you have hemorrhoids or swelling or pain around the opening of your vagina, try using cold and heat. You can put ice or a cold pack on the area for 10 to 20 minutes at a time. Put a thin cloth between the ice and your skin. Also try sitting in a few inches of warm water (sitz bath) 3 times a day and after bowel movements. · Take pain medicines exactly as directed. ? If the doctor gave you a prescription medicine for pain, take it as prescribed. ? If you are not taking a prescription pain medicine, ask your doctor if you can take an over-the-counter medicine. · Eat more fiber to avoid constipation. Include foods such as whole-grain breads and cereals, raw vegetables, raw and dried fruits, and beans. · Drink plenty of fluids, enough so that your urine is light yellow or clear like water. If you have kidney, heart, or liver disease and have to limit fluids, talk with your doctor before you increase the amount of fluids you drink. · Do not rinse inside your vagina with fluids (douche). · If you have stitches, keep the area clean by pouring or spraying warm water over the area outside your vagina and anus after you use the toilet. · Keep a list of questions to bring to your postpartum visit. Your questions might be about:  ? Changes in your breasts, such as lumps or soreness. ? When to expect your menstrual period to start again. ? What form of birth control is best for you. ? Weight you have put on during the pregnancy. ? Exercise options.   ? What foods and drinks are best for you, especially if you are breastfeeding. ? Problems you might be having with breastfeeding. ? When you can have sex. Some women may want to talk about lubricants for the vagina. ? Any feelings of sadness or restlessness that you are having. When should you call for help? Call 911 anytime you think you may need emergency care. For example, call if:    · You have thoughts of harming yourself, your baby, or another person.     · You passed out (lost consciousness).    Call your doctor now or seek immediate medical care if:    · Your vaginal bleeding seems to be getting heavier.     · You are dizzy or lightheaded, or you feel like you may faint.     · You have a fever.    Watch closely for changes in your health, and be sure to contact your doctor if:    · You have new or worse vaginal discharge.     · You feel sad or depressed.     · You are having problems with your breasts or breastfeeding. Where can you learn more? Go to http://ronni-bryanna.info/. Enter D435 in the search box to learn more about \"Postpartum: Care Instructions. \"  Current as of: September 5, 2018  Content Version: 11.9  © 2480-3229 [employer]. Care instructions adapted under license by [employer] (which disclaims liability or warranty for this information). If you have questions about a medical condition or this instruction, always ask your healthcare professional. [address] any warranty or liability for your use of this information. Atopic Dermatitis: Care Instructions  Your Care Instructions  Atopic dermatitis (also called eczema) is a skin problem that causes intense itching and a red, raised rash. In severe cases, the rash develops clear fluid-filled blisters. The rash is not contagious. People with this condition seem to have very sensitive immune systems that are likely to react to things that cause allergies.  The immune system is the body's way of fighting infection. There is no cure for atopic dermatitis, but you may be able to control it with care at home. Follow-up care is a key part of your treatment and safety. Be sure to make and go to all appointments, and call your doctor if you are having problems. It's also a good idea to know your test results and keep a list of the medicines you take. How can you care for yourself at home? · Use moisturizer at least twice a day. · If your doctor prescribes a cream, use it as directed. If your doctor prescribes other medicine, take it exactly as directed. · Wash the affected area with water only. Soap can make dryness and itching worse. Pat dry. · Apply a moisturizer after bathing. Use a cream such as Lubriderm, Moisturel, or Cetaphil that does not irritate the skin or cause a rash. Apply the cream while your skin is still damp after lightly drying with a towel. · Use cold, wet cloths to reduce itching. · Keep cool, and stay out of the sun. · If itching affects your normal activities, an over-the-counter antihistamine, such as diphenhydramine (Benadryl) or loratadine (Claritin) may help. Read and follow all instructions on the label. When should you call for help? Call your doctor now or seek immediate medical care if:    · Your rash gets worse and you have a fever.     · You have new blisters or bruises, or the rash spreads and looks like a sunburn.     · You have signs of infection, such as:  ? Increased pain, swelling, warmth, or redness. ? Red streaks leading from the rash. ? Pus draining from the rash. ? A fever.     · You have crusting or oozing sores.     · You have joint aches or body aches along with your rash.    Watch closely for changes in your health, and be sure to contact your doctor if:    · Your rash does not clear up after 2 to 3 weeks of home treatment.     · Itching interferes with your sleep or daily activities. Where can you learn more?   Go to http://ronni-bryanna.info/. Enter Q516 in the search box to learn more about \"Atopic Dermatitis: Care Instructions. \"  Current as of: April 17, 2018  Content Version: 11.9  © 6405-6407 [employer]. Care instructions adapted under license by [employer] (which disclaims liability or warranty for this information). If you have questions about a medical condition or this instruction, always ask your healthcare professional. [place] [address] any warranty or liability for your use of this information.

## 2019-02-21 ENCOUNTER — TELEPHONE (OUTPATIENT)
Dept: OBGYN CLINIC | Age: 24
End: 2019-02-21

## 2020-07-13 PROBLEM — Z34.90 TERM PREGNANCY: Status: ACTIVE | Noted: 2020-07-13

## 2021-06-11 ENCOUNTER — HOSPITAL ENCOUNTER (INPATIENT)
Age: 26
LOS: 4 days | Discharge: HOME | DRG: 751 | End: 2021-06-15
Attending: EMERGENCY MEDICINE | Admitting: PSYCHIATRY & NEUROLOGY
Payer: MEDICAID

## 2021-06-11 DIAGNOSIS — F33.2 MDD (MAJOR DEPRESSIVE DISORDER), RECURRENT SEVERE, WITHOUT PSYCHOSIS (HCC): ICD-10-CM

## 2021-06-11 DIAGNOSIS — R45.851 SUICIDAL IDEATION: Primary | ICD-10-CM

## 2021-06-11 PROBLEM — F31.81 BIPOLAR 2 DISORDER, MAJOR DEPRESSIVE EPISODE (HCC): Status: ACTIVE | Noted: 2021-06-11

## 2021-06-11 LAB
AMPHET UR QL SCN: NEGATIVE
ANION GAP SERPL CALC-SCNC: 6 MMOL/L (ref 3–18)
BARBITURATES UR QL SCN: NEGATIVE
BASOPHILS # BLD: 0 K/UL (ref 0–0.1)
BASOPHILS NFR BLD: 1 % (ref 0–2)
BENZODIAZ UR QL: NEGATIVE
BUN SERPL-MCNC: 11 MG/DL (ref 7–18)
BUN/CREAT SERPL: 22 (ref 12–20)
CALCIUM SERPL-MCNC: 9.3 MG/DL (ref 8.5–10.1)
CANNABINOIDS UR QL SCN: NEGATIVE
CHLORIDE SERPL-SCNC: 107 MMOL/L (ref 100–111)
CO2 SERPL-SCNC: 26 MMOL/L (ref 21–32)
COCAINE UR QL SCN: NEGATIVE
COVID-19 RAPID TEST, COVR: NOT DETECTED
CREAT SERPL-MCNC: 0.51 MG/DL (ref 0.6–1.3)
DIFFERENTIAL METHOD BLD: NORMAL
EOSINOPHIL # BLD: 0.1 K/UL (ref 0–0.4)
EOSINOPHIL NFR BLD: 1 % (ref 0–5)
ERYTHROCYTE [DISTWIDTH] IN BLOOD BY AUTOMATED COUNT: 12.6 % (ref 11.6–14.5)
ETHANOL SERPL-MCNC: <3 MG/DL (ref 0–3)
GLUCOSE SERPL-MCNC: 84 MG/DL (ref 74–99)
HCG SERPL QL: NEGATIVE
HCT VFR BLD AUTO: 40.6 % (ref 35–45)
HDSCOM,HDSCOM: NORMAL
HGB BLD-MCNC: 13.9 G/DL (ref 12–16)
LYMPHOCYTES # BLD: 2 K/UL (ref 0.9–3.6)
LYMPHOCYTES NFR BLD: 32 % (ref 21–52)
MCH RBC QN AUTO: 30.5 PG (ref 24–34)
MCHC RBC AUTO-ENTMCNC: 34.2 G/DL (ref 31–37)
MCV RBC AUTO: 89 FL (ref 74–97)
METHADONE UR QL: NEGATIVE
MONOCYTES # BLD: 0.2 K/UL (ref 0.05–1.2)
MONOCYTES NFR BLD: 4 % (ref 3–10)
NEUTS SEG # BLD: 3.9 K/UL (ref 1.8–8)
NEUTS SEG NFR BLD: 62 % (ref 40–73)
OPIATES UR QL: NEGATIVE
PCP UR QL: NEGATIVE
PLATELET # BLD AUTO: 278 K/UL (ref 135–420)
PMV BLD AUTO: 10.3 FL (ref 9.2–11.8)
POTASSIUM SERPL-SCNC: 3.8 MMOL/L (ref 3.5–5.5)
RBC # BLD AUTO: 4.56 M/UL (ref 4.2–5.3)
SODIUM SERPL-SCNC: 139 MMOL/L (ref 136–145)
SOURCE, COVRS: NORMAL
WBC # BLD AUTO: 6.2 K/UL (ref 4.6–13.2)

## 2021-06-11 PROCEDURE — 80307 DRUG TEST PRSMV CHEM ANLYZR: CPT

## 2021-06-11 PROCEDURE — 87635 SARS-COV-2 COVID-19 AMP PRB: CPT

## 2021-06-11 PROCEDURE — 84703 CHORIONIC GONADOTROPIN ASSAY: CPT

## 2021-06-11 PROCEDURE — 74011250637 HC RX REV CODE- 250/637: Performed by: PHYSICIAN ASSISTANT

## 2021-06-11 PROCEDURE — 82077 ASSAY SPEC XCP UR&BREATH IA: CPT

## 2021-06-11 PROCEDURE — 99283 EMERGENCY DEPT VISIT LOW MDM: CPT

## 2021-06-11 PROCEDURE — 85025 COMPLETE CBC W/AUTO DIFF WBC: CPT

## 2021-06-11 PROCEDURE — 80048 BASIC METABOLIC PNL TOTAL CA: CPT

## 2021-06-11 PROCEDURE — 65220000003 HC RM SEMIPRIVATE PSYCH

## 2021-06-11 RX ORDER — ACETAMINOPHEN 500 MG
1000 TABLET ORAL
Status: COMPLETED | OUTPATIENT
Start: 2021-06-11 | End: 2021-06-11

## 2021-06-11 RX ORDER — HYDROXYZINE PAMOATE 25 MG/1
25 CAPSULE ORAL
Status: DISCONTINUED | OUTPATIENT
Start: 2021-06-11 | End: 2021-06-15 | Stop reason: HOSPADM

## 2021-06-11 RX ORDER — TRAZODONE HYDROCHLORIDE 50 MG/1
50 TABLET ORAL
Status: DISCONTINUED | OUTPATIENT
Start: 2021-06-11 | End: 2021-06-15 | Stop reason: HOSPADM

## 2021-06-11 RX ADMIN — ACETAMINOPHEN 1000 MG: 500 TABLET ORAL at 12:06

## 2021-06-11 NOTE — ED PROVIDER NOTES
EMERGENCY DEPARTMENT HISTORY AND PHYSICAL EXAM    Date: 6/11/2021  Patient Name: Wilmer Padilla    History of Presenting Illness     Chief Complaint   Patient presents with   [address] Problem         History Provided By: Patient    Chief Complaint: Suicidal ideations  Duration: Days  Timing: Intermittent  Location: N/A  Quality: Suicidal  Severity: Moderate to severe  Modifying Factors: None  Associated Symptoms: none       Additional History (Context): Wilmer Padilla is a 23 y.o. female with a history of eczema and bipolar disorder who presents here for issues listed above. Patient reports roughly 4 years ago she was diagnosed as bipolar, states at that time she did try a medication but states it was not right for her. Has not been on medication for this since. Patient reports over the past couple days she has had some intermittent thoughts of suicide without a plan. Patient denies any hallucinations. Does admit she drinks socially and abuses tobacco and marijuana. Denies any recent new life stressors. PCP: Rach, MD Erik        Past History     Past Medical History:  Past Medical History:   Diagnosis Date    Eczema     Psychiatric disorder     bipolar       Past Surgical History:  History reviewed. No pertinent surgical history. Family History:  History reviewed. No pertinent family history. Social History:  Social History     Tobacco Use    Smoking status: Former Smoker     Quit date: 04/2018     Years since quitting: 3.1    Smokeless tobacco: Never Used   Substance Use Topics    Alcohol use: Yes     Comment: socially    Drug use: Yes     Types: Marijuana     Comment: last used 6/9/21       Allergies:  No Known Allergies      Review of Systems   Review of Systems   Constitutional: Negative for chills and fever. HENT: Negative for congestion, rhinorrhea and sore throat. Respiratory: Negative for cough and shortness of breath. Cardiovascular: Negative for chest pain. Gastrointestinal: Negative for abdominal pain, blood in stool, constipation, diarrhea, nausea and vomiting. Genitourinary: Negative for dysuria, frequency and hematuria. Musculoskeletal: Negative for back pain and myalgias. Skin: Negative for rash and wound. Neurological: Negative for dizziness and headaches. Psychiatric/Behavioral: Positive for suicidal ideas. The patient is not nervous/anxious. All other systems reviewed and are negative. All Other Systems Negative  Physical Exam     Vitals:    06/11/21 1037 06/11/21 1209   BP: (!) 144/89    Pulse: 95    Resp: 18    Temp: 98.4 °F (36.9 °C)    SpO2: 98% 98%   Weight: 62.6 kg (138 lb)    Height: 5' 2\" (1.575 m)      Physical Exam  Vitals and nursing note reviewed. Constitutional:       General: She is not in acute distress. Appearance: She is well-developed. She is not diaphoretic. HENT:      Head: Normocephalic and atraumatic. Eyes:      Conjunctiva/sclera: Conjunctivae normal.   Cardiovascular:      Rate and Rhythm: Normal rate and regular rhythm. Heart sounds: Normal heart sounds. Pulmonary:      Effort: Pulmonary effort is normal. No respiratory distress. Breath sounds: Normal breath sounds. Chest:      Chest wall: No tenderness. Abdominal:      General: Bowel sounds are normal. There is no distension. Palpations: Abdomen is soft. Tenderness: There is no abdominal tenderness. There is no guarding or rebound. Musculoskeletal:         General: No deformity. Cervical back: Normal range of motion and neck supple. Skin:     General: Skin is warm and dry. Neurological:      Mental Status: She is alert and oriented to person, place, and time. Deep Tendon Reflexes: Reflexes are normal and symmetric. Psychiatric:         Attention and Perception: Attention normal.         Mood and Affect: Mood normal.         Speech: Speech normal.         Behavior: Behavior normal.         Thought Content:  Thought content includes suicidal ideation. Thought content does not include homicidal ideation. Thought content does not include homicidal or suicidal plan. Diagnostic Study Results     Labs -     Recent Results (from the past 12 hour(s))   ETHYL ALCOHOL    Collection Time: 06/11/21 10:54 AM   Result Value Ref Range    ALCOHOL(ETHYL),SERUM <3 0 - 3 MG/DL   CBC WITH AUTOMATED DIFF    Collection Time: 06/11/21 10:54 AM   Result Value Ref Range    WBC 6.2 4.6 - 13.2 K/uL    RBC 4.56 4.20 - 5.30 M/uL    HGB 13.9 12.0 - 16.0 g/dL    HCT 40.6 35.0 - 45.0 %    MCV 89.0 74.0 - 97.0 FL    MCH 30.5 24.0 - 34.0 PG    MCHC 34.2 31.0 - 37.0 g/dL    RDW 12.6 11.6 - 14.5 %    PLATELET 577 355 - 283 K/uL    MPV 10.3 9.2 - 11.8 FL    NEUTROPHILS 62 40 - 73 %    LYMPHOCYTES 32 21 - 52 %    MONOCYTES 4 3 - 10 %    EOSINOPHILS 1 0 - 5 %    BASOPHILS 1 0 - 2 %    ABS. NEUTROPHILS 3.9 1.8 - 8.0 K/UL    ABS. LYMPHOCYTES 2.0 0.9 - 3.6 K/UL    ABS. MONOCYTES 0.2 0.05 - 1.2 K/UL    ABS. EOSINOPHILS 0.1 0.0 - 0.4 K/UL    ABS.  BASOPHILS 0.0 0.0 - 0.1 K/UL    DF AUTOMATED     METABOLIC PANEL, BASIC    Collection Time: 06/11/21 10:54 AM   Result Value Ref Range    Sodium 139 136 - 145 mmol/L    Potassium 3.8 3.5 - 5.5 mmol/L    Chloride 107 100 - 111 mmol/L    CO2 26 21 - 32 mmol/L    Anion gap 6 3.0 - 18 mmol/L    Glucose 84 74 - 99 mg/dL    BUN 11 7.0 - 18 MG/DL    Creatinine 0.51 (L) 0.6 - 1.3 MG/DL    BUN/Creatinine ratio 22 (H) 12 - 20      GFR est AA >60 >60 ml/min/1.73m2    GFR est non-AA >60 >60 ml/min/1.73m2    Calcium 9.3 8.5 - 10.1 MG/DL   HCG QL SERUM    Collection Time: 06/11/21 10:54 AM   Result Value Ref Range    HCG, Ql. Negative NEG     DRUG SCREEN, URINE    Collection Time: 06/11/21 11:07 AM   Result Value Ref Range    BENZODIAZEPINES Negative NEG      BARBITURATES Negative NEG      THC (TH-CANNABINOL) Negative NEG      OPIATES Negative NEG      PCP(PHENCYCLIDINE) Negative NEG      COCAINE Negative NEG      AMPHETAMINES Negative NEG      METHADONE Negative NEG      HDSCOM (NOTE)    COVID-19 RAPID TEST    Collection Time: 06/11/21 12:00 PM   Result Value Ref Range    Specimen source Nasopharyngeal      COVID-19 rapid test Not detected NOTD         Radiologic Studies -   No orders to display     CT Results  (Last 48 hours)    None        CXR Results  (Last 48 hours)    None            Medical Decision Making   I am the first provider for this patient. I reviewed the vital signs, available nursing notes, past medical history, past surgical history, family history and social history. Vital Signs-Reviewed the patient's vital signs. Records Reviewed: Nursing Notes and Old Medical Records     Procedures: None   Procedures    Provider Notes (Medical Decision Making):     Differential Diagnosis: substance abuse, alcohol intoxication, substance withdrawal, acute psychotic episode, anxiety, panic disorder, jason, undifferentiated schizophrenia, depression, SI, HI, medication non-compliance, other mood disorder    Plan: Will medically clear and consult crisis     12:32 PM  Have discussed case with lee Lopez who agrees to consult. 1:56 PM  Crisis agrees with need for admission. Patient is also agreeable. MED RECONCILIATION:  No current facility-administered medications for this encounter. No current outpatient medications on file. Disposition:  Behavioral health admission    Diagnosis     Clinical Impression:   1. Suicidal ideation          \"Please note that this dictation was completed with [employer], the computer voice recognition software. Quite often unanticipated grammatical, syntax, homophones, and other interpretive errors are inadvertently transcribed by the computer software. Please disregard these errors. Please excuse any errors that have escaped final proofreading. \"

## 2021-06-11 NOTE — ED TRIAGE NOTES
Patient states she was diagnosed with bipolar 4 years ago, states she is not on medications for bipolar, states the mood swings have gotten progressively worse

## 2021-06-11 NOTE — BH NOTES
Patient arrives alert and oriented via wheelchair. Offers no complaints. Denies SI HI AVH. Negative for drugs and alcohol. States in ER she has thoughts of hurting herself. SI in ER with no plan. NEG pregnancy. Tylenol in ER PO 1(one) gram per MD order at 9152 PM. Quiet guarded suspicious dull flat sad depressed reserved isolative withdrawn anxious cooperative. Stays to self in room through most of shift. Interact very little with staff ad or peers. Will continue to monitor and support. RN's will initiate develop implement review revise treatment plan.
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
## 2021-06-11 NOTE — ED NOTES
TRANSFER - OUT REPORT:    Verbal report given to Angelica(name) on Dong Jimenez  being transferred to [id number] (unit) for routine progression of care       Report consisted of patients Situation, Background, Assessment and   Recommendations(SBAR). Information from the following report(s) SBAR, ED Summary and MAR was reviewed with the receiving nurse. Lines:       Opportunity for questions and clarification was provided.       Patient transported with:   [employer]

## 2021-06-12 PROCEDURE — 65220000003 HC RM SEMIPRIVATE PSYCH

## 2021-06-12 PROCEDURE — 99222 1ST HOSP IP/OBS MODERATE 55: CPT | Performed by: PSYCHIATRY & NEUROLOGY

## 2021-06-12 PROCEDURE — 74011250637 HC RX REV CODE- 250/637: Performed by: PSYCHIATRY & NEUROLOGY

## 2021-06-12 RX ORDER — ACETAMINOPHEN 500 MG
2 TABLET ORAL
Status: DISCONTINUED | OUTPATIENT
Start: 2021-06-12 | End: 2021-06-15 | Stop reason: HOSPADM

## 2021-06-12 RX ADMIN — TRAZODONE HYDROCHLORIDE 50 MG: 50 TABLET ORAL at 20:55

## 2021-06-12 RX ADMIN — HYDROXYZINE PAMOATE 25 MG: 25 CAPSULE ORAL at 20:54

## 2021-06-12 NOTE — BH NOTES
Group Therapy Note    Patient: Aneesh Horowitz    Patient # in Group: 10    Group Type: Leisure-Creative Group    Group Time: 30 minutes    Method used: Free discussion    Attendance: Aneesh Horowitz was      compliant with group and participated fully for 100% of the duration. Interaction Narrative: Aneesh Horowitz had a Depressed mood, was Cooperative during group and Vidya Batres's thought content was Goal Directed. Writer Reinforced patient's understanding of the possibilities that leisure activities and hobbies that can be engaged in to help reduce stressors in life. Patient was Able to listen to others, Able to give feedback to another and Able to manage/cope with feelings. Will continue to monitor and provide redirection, education, and support as needed.

## 2021-06-12 NOTE — PROGRESS NOTES
Problem: Suicide  Goal: *STG: Remains safe in hospital  Description: AEB pt not harming self or others for the next 6 days while in the hospital  Outcome: Progressing Towards Goal  Goal: *STG: Seeks staff when feelings of self harm or harm towards others arise  Description: AEB pt seeking staff as needed for the next 6 days while in the hospital  Outcome: Progressing Towards Goal  Goal: *STG: Attends activities and groups  Description: AEB pt attending 3 groups daily for the next 4 days while in the hospital  Outcome: Progressing Towards Goal  Goal: *STG/LTG: Complies with medication therapy  Description: AEB pt taking all scheduled medications for the next 6 days while in the hospital  Outcome: Progressing Towards Goal     Otis Dewitt is a 23 y.o. Female that presented today as anxious, depressed, but attentive, redirectable, and cooperative with staff. Otis Dewitt has been compliant with medications, has eaten all meals offered, and has attended groups offered. RN's will initiate, develop, implement, review, and revise treatment plans as necessary. Will continue to provide education, redirection, and support as needed or verbalized by patient.    Signed By: Shilo Quintero RN     June 12, 2021
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
## 2021-06-13 PROBLEM — F31.81 BIPOLAR 2 DISORDER, MAJOR DEPRESSIVE EPISODE (HCC): Status: RESOLVED | Noted: 2021-06-11 | Resolved: 2021-06-13

## 2021-06-13 PROBLEM — F33.2 MDD (MAJOR DEPRESSIVE DISORDER), RECURRENT SEVERE, WITHOUT PSYCHOSIS (HCC): Status: ACTIVE | Noted: 2021-06-13

## 2021-06-13 LAB
ALBUMIN SERPL-MCNC: 3.6 G/DL (ref 3.4–5)
ALBUMIN/GLOB SERPL: 1.1 {RATIO} (ref 0.8–1.7)
ALP SERPL-CCNC: 46 U/L (ref 45–117)
ALT SERPL-CCNC: 25 U/L (ref 13–56)
AST SERPL-CCNC: 16 U/L (ref 10–38)
BILIRUB DIRECT SERPL-MCNC: <0.1 MG/DL (ref 0–0.2)
BILIRUB SERPL-MCNC: 0.4 MG/DL (ref 0.2–1)
CHOLEST SERPL-MCNC: 206 MG/DL
GLOBULIN SER CALC-MCNC: 3.3 G/DL (ref 2–4)
HBA1C MFR BLD: 5.4 % (ref 4.2–5.6)
HDLC SERPL-MCNC: 66 MG/DL (ref 40–60)
HDLC SERPL: 3.1 {RATIO} (ref 0–5)
LDLC SERPL CALC-MCNC: 124.2 MG/DL (ref 0–100)
LIPID PROFILE,FLP: ABNORMAL
PROT SERPL-MCNC: 6.9 G/DL (ref 6.4–8.2)
TRIGL SERPL-MCNC: 79 MG/DL (ref ?–150)
TSH SERPL DL<=0.05 MIU/L-ACNC: 1.37 UIU/ML (ref 0.36–3.74)
VLDLC SERPL CALC-MCNC: 15.8 MG/DL

## 2021-06-13 PROCEDURE — 84443 ASSAY THYROID STIM HORMONE: CPT

## 2021-06-13 PROCEDURE — 74011250637 HC RX REV CODE- 250/637: Performed by: PSYCHIATRY & NEUROLOGY

## 2021-06-13 PROCEDURE — 99231 SBSQ HOSP IP/OBS SF/LOW 25: CPT | Performed by: PSYCHIATRY & NEUROLOGY

## 2021-06-13 PROCEDURE — 65220000003 HC RM SEMIPRIVATE PSYCH

## 2021-06-13 PROCEDURE — 36415 COLL VENOUS BLD VENIPUNCTURE: CPT

## 2021-06-13 PROCEDURE — 83036 HEMOGLOBIN GLYCOSYLATED A1C: CPT

## 2021-06-13 PROCEDURE — 80061 LIPID PANEL: CPT

## 2021-06-13 PROCEDURE — 80076 HEPATIC FUNCTION PANEL: CPT

## 2021-06-13 RX ADMIN — NICOTINE POLACRILEX 2 MG: 2 GUM, CHEWING ORAL at 10:39

## 2021-06-13 RX ADMIN — TRAZODONE HYDROCHLORIDE 50 MG: 50 TABLET ORAL at 20:46

## 2021-06-13 RX ADMIN — NICOTINE POLACRILEX 2 MG: 2 GUM, CHEWING ORAL at 17:06

## 2021-06-13 RX ADMIN — HYDROXYZINE PAMOATE 25 MG: 25 CAPSULE ORAL at 20:46

## 2021-06-13 RX ADMIN — NICOTINE POLACRILEX 2 MG: 2 GUM, CHEWING ORAL at 20:46

## 2021-06-13 NOTE — BH NOTES
Patient given Vistaril for anxiety and Trazodone for insomnia per patient request will continue to follow current POC and interventions per policies/protocols.

## 2021-06-13 NOTE — GROUP NOTE
[facility] GROUP DOCUMENTATION INDIVIDUAL Group Therapy Note Date: 6/12/2021 Group Start Time: 2130 Group End Time: 2145 Group Topic: Focus Group [address] ADULT CHEM DEP Sybil King RN 
 
[facility] GROUP DOCUMENTATION GROUP Group Therapy Note Attendees: 3 Attendance: Attended Patient's Goal:  Patient to be able to focus on activity. Interventions/techniques: Informed Follows Directions: Followed directions Interactions: Interacted appropriately Mental Status: Calm Behavior/appearance: Cooperative Goals Achieved: Able to listen to others Delores Nguyen RN

## 2021-06-13 NOTE — H&P
Psychiatry History and Physical    Subjective:     Date of Evaluation:  6/13/2021    Reason for Referral:  Anshu Valenzuela was referred to the examiners from  for SI. History of Presenting Problem: Anshu Valenzuela is a 24 yo F with PMH Bipolar disorder who was admitted for SI. She currently denies SI, HI and hallucinations. Tox screen, EtOH and Rapid Covid all negative. She denies any physical complaints today. Patient Active Problem List    Diagnosis Date Noted    Bipolar 2 disorder, major depressive episode ([facility] [address] 06/11/2021    Term pregnancy 07/13/2020    Labor without complication 52/89/3634     Past Medical History:   Diagnosis Date    Eczema     Psychiatric disorder     bipolar     History reviewed. No pertinent surgical history. History reviewed. No pertinent family history. Social History     Tobacco Use    Smoking status: Former Smoker     Quit date: 04/2018     Years since quitting: 3.2    Smokeless tobacco: Never Used   Substance Use Topics    Alcohol use: Yes     Comment: socially     Prior to Admission medications    Not on File     No Known Allergies     Review of Systems - History obtained from chart review and the patient  General ROS: negative  Psychological ROS: positive for - depression  Ophthalmic ROS: negative  ENT ROS: negative  Respiratory ROS: negative  Cardiovascular ROS: negative  Gastrointestinal ROS: negative  Musculoskeletal ROS: negative  Neurological ROS: negative  Dermatological ROS: negative      Objective:     No data found.     Mental Status exam: WNL except for    Sensorium  oriented to time, place and person   Orientation situation   Relations cooperative   Eye Contact appropriate   Appearance:  age appropriate   Motor Behavior:  within normal limits   Speech:  soft   Vocabulary average   Thought Process: goal directed   Thought Content free of delusions and free of hallucinations   Suicidal ideations intention and no plan    Homicidal ideations no plan  and no intention   Mood:  stable   Affect:  stable   Memory recent  adequate   Memory remote:  adequate   Concentration:  adequate   Abstraction:  concrete   Insight:  fair   Reliability fair   Judgment:  fair         Physical Exam:   Visit Vitals  /74   Pulse 81   Temp 97.4 °F (36.3 °C)   Resp 18   Ht 5' 2\" (1.575 m)   Wt 62.6 kg (138 lb)   LMP 06/09/2021   SpO2 99%   Breastfeeding No Comment: HCG is negative 6/11/2021   BMI 25.24 kg/m²     General appearance: alert, cooperative, no distress, appears stated age  Head: Normocephalic, without obvious abnormality, atraumatic  Eyes: negative  Ears: normal TM's and external ear canals AU  Nose: Nares normal. Septum midline. Mucosa normal. No drainage or sinus tenderness. Throat: Lips, mucosa, and tongue normal. Teeth and gums normal  Neck: supple, symmetrical, trachea midline and no adenopathy  Lungs: clear to auscultation bilaterally  Heart: regular rate and rhythm, S1, S2 normal, no murmur, click, rub or gallop  Abdomen: soft, non-tender. Extremities: extremities normal, atraumatic, no cyanosis or edema  Skin: Skin color, texture, turgor normal. No rashes or lesions  Neurologic: Grossly normal        Impression:      Active Problems:    Bipolar 2 disorder, major depressive episode ([address] (6/11/2021)          Plan:     Recommendations for Treatment/Conditions:  Psychiatric treatment recommended while in hospital  Admit to inpatient psych for SI    Referral To:    Inpatient psychiatric care        [place], [place]   6/13/2021 11:06 AM

## 2021-06-13 NOTE — BH NOTES
Group Therapy Note    Patient: Ze Anderson    Patient # in Group: 11    Group Type: Leisure-Creative Group    Group Time: 30 minutes    Method used: Handouts    Attendance: Ze Anderson was      compliant with group and participated when directly confronted for 100% of the duration. Interaction Narrative: Ze Anderson had a Depressed mood, was Cooperative during group and Vidya Batres's thought content was Negativistic. Writer Reinforced patient's understanding of how to use leisure activities, such as crossword puzzles, to help alleviate stressors of life, which enables one to better handle their diagnosis. Patient was Able to engage in interactions, Able to reflect/comment on own behavior, Able to experience relief/decrease in symptoms and Discussed self-esteem issues. Will continue to monitor and provide redirection, education, and support as needed.

## 2021-06-13 NOTE — PROGRESS NOTES
Problem: Suicide  Goal: *STG: Remains safe in hospital  Description: AEB pt not harming self or others for the next 6 days while in the hospital  Outcome: Progressing Towards Goal  Goal: *STG: Seeks staff when feelings of self harm or harm towards others arise  Description: AEB pt seeking staff as needed for the next 6 days while in the hospital  Outcome: Progressing Towards Goal  Goal: *STG: Attends activities and groups  Description: AEB pt attending 3 groups daily for the next 4 days while in the hospital  Outcome: Progressing Towards Goal  Goal: *STG/LTG: Complies with medication therapy  Description: AEB pt taking all scheduled medications for the next 6 days while in the hospital  Outcome: Progressing Towards Goal     David Zuniga is a 23 y.o. Female that presented today as depressed, but denies SI, observed to be bright affect/smiling, and cooperative with staff. David Zuniga has been compliant with medications, has eaten all meals offered, and has attended groups. RN's will initiate, develop, implement, review, and revise treatment plans as necessary. Will continue to provide education, redirection, and support as needed or verbalized by patient.    Signed By: Donnie Spear RN     June 13, 2021

## 2021-06-13 NOTE — BH NOTES
MHT Note:     At the beginning of the shift (0700 - 1530), pt has been complaint with medication regimen, but has been intermittently compliant with program rules and staff instructions. Pt becomes combative when redirected for behaviors, and asked to adhere to certain rules. Pt presented a constricted affect, pt is struggling to manage her responses to internal and external stimuli. Pt, spent majority of the shift in the dayroom appropriately socializing with peers, she did consume about eighty percent of breakfast and lunch. Staff, will continue round monitoring of pt for any changes in affect, behavior, location, and safety.

## 2021-06-13 NOTE — PROGRESS NOTES
[address] [facility] [address]  Inpatient Progress Note     Date of Service: 06/13/21  Hospital Day: 2     Subjective/Interval History   06/13/21    Treatment Team Notes:  Notes reviewed and/or discussed and report that Alexandra Mishra is a patient with a history of a mood disorder recently admitted to the facility. Attention is invited to the dictated admission note which is self-explanatory. Patient interview: Alexandra Mishra was interviewed by this writer today. During psych rounds today the patient continues to describe the presence of depression. There is a history of irritability and some problems with self-control, however there is no major evidence of hypomania or jason described by the patient again today. So currently we are working with a diagnosis of her recurrent major depressive disorder with the possibility of her suffer with an Axis II pathology is also being raised. Discussion followed during our session today regarding antidepressant treatment. Citalopram will be restarted with with side effects and adverse effects clearly explained to the patient. The patient agrees to current treatment plan. Objective     Visit Vitals  BP (!) 106/56 (BP 1 Location: Right arm, BP Patient Position: Sitting)   Pulse 86   Temp 97.7 °F (36.5 °C)   Resp 18   Ht 5' 2\" (1.575 m)   Wt 62.6 kg (138 lb)   SpO2 99%   Breastfeeding No   BMI 25.24 kg/m²     Blood pressure is low however there is no evidence of postural hypotension related symptoms    Recent Results (from the past 24 hour(s))   HEPATIC FUNCTION PANEL    Collection Time: 06/13/21  7:26 AM   Result Value Ref Range    Protein, total 6.9 6.4 - 8.2 g/dL    Albumin 3.6 3.4 - 5.0 g/dL    Globulin 3.3 2.0 - 4.0 g/dL    A-G Ratio 1.1 0.8 - 1.7      Bilirubin, total 0.4 0.2 - 1.0 MG/DL    Bilirubin, direct <0.1 0.0 - 0.2 MG/DL    Alk.  phosphatase 46 45 - 117 U/L    AST (SGOT) 16 10 - 38 U/L    ALT (SGPT) 25 13 - 56 U/L   LIPID PANEL    Collection Time: 06/13/21  7:26 AM   Result Value Ref Range    LIPID PROFILE          Cholesterol, total 206 (H) <200 MG/DL    Triglyceride 79 <150 MG/DL    HDL Cholesterol 66 (H) 40 - 60 MG/DL    LDL, calculated 124.2 (H) 0 - 100 MG/DL    VLDL, calculated 15.8 MG/DL    CHOL/HDL Ratio 3.1 0 - 5.0     HEMOGLOBIN A1C W/O EAG    Collection Time: 06/13/21  7:26 AM   Result Value Ref Range    Hemoglobin A1c 5.4 4.2 - 5.6 %   TSH 3RD GENERATION    Collection Time: 06/13/21  7:26 AM   Result Value Ref Range    TSH 1.37 0.36 - 3.74 uIU/mL     Above results noted. Mild elevation of the patient's cholesterol noted however her HDL cholesterol show excellent results even though her LDL is above 100. Further discussion followed however the patient will be advised about better choices regarding her fluid intake. Mental Status Examination     Appearance/Hygiene 23 y.o. BLACK/ female  Hygiene: Fair   Behavior/Social Relatedness Appropriate   Musculoskeletal Gait/Station: appropriate  Tone (flaccid, cogwheeling, spastic): not assessed  Psychomotor (hyperkinetic, hypokinetic): calm   Involuntary movements (tics, dyskinesias, akathisa, stereotypies): none   Speech   Rate, rhythm, volume, fluency and articulation are appropriate   Mood   depressed   Affect    irritable at times   Thought Process Linear and goal directed   Thought Content and Perceptual Disturbances Denies self-injurious behavior (SIB), suicidal ideation (SI), aggressive behavior or homicidal ideation (HI), however has a history of cutting. Last episode happened 4 weeks ago. Her clots are usually on her thighs and are very superficial.  Denies auditory and visual hallucinations, there is no evidence of delusions, ideas of reference or influence either.    Sensorium and Cognition  Grossly intact   Insight  improving   Judgment  improving        Assessment/Plan      Psychiatric Diagnoses:   Patient Active Problem List   Diagnosis Code    Labor without complication M32    Term pregnancy Z34.90    Bipolar 2 disorder, major depressive episode ([facility] [address] F31.81       Medical Diagnoses: Same plus recurrent major depressive disorder    Psychosocial and contextual factors: Same    Level of impairment/disability: Moderate    1. Treatment with citalopram will be started. Low dose of 10 mg every morning has been ordered. The patient was advised about side effects and adverse effects associated to treatment with antidepressant therapy and the specifically she was advised that if at any time her suicidal thoughts get worse, to immediately let staff know or to the outpatient provider that we will be asking the patient to seek further follow-up after she leaves the hospital.  2.  Reviewed instructions, risks, benefits and side effects of medications  3. Disposition/Discharge Date: self-care/home, in a couple of days if stable.     Madi Claire MD, [address] [place] [address]  Psychiatry

## 2021-06-14 PROCEDURE — 99232 SBSQ HOSP IP/OBS MODERATE 35: CPT | Performed by: PSYCHIATRY & NEUROLOGY

## 2021-06-14 PROCEDURE — 65220000003 HC RM SEMIPRIVATE PSYCH

## 2021-06-14 PROCEDURE — 74011250637 HC RX REV CODE- 250/637: Performed by: PSYCHIATRY & NEUROLOGY

## 2021-06-14 RX ORDER — CITALOPRAM 10 MG/1
10 TABLET ORAL DAILY
Status: DISCONTINUED | OUTPATIENT
Start: 2021-06-14 | End: 2021-06-15

## 2021-06-14 RX ADMIN — CITALOPRAM HYDROBROMIDE 10 MG: 10 TABLET ORAL at 10:16

## 2021-06-14 RX ADMIN — NICOTINE POLACRILEX 2 MG: 2 GUM, CHEWING ORAL at 19:00

## 2021-06-14 RX ADMIN — HYDROXYZINE PAMOATE 25 MG: 25 CAPSULE ORAL at 21:59

## 2021-06-14 RX ADMIN — NICOTINE POLACRILEX 2 MG: 2 GUM, CHEWING ORAL at 09:10

## 2021-06-14 RX ADMIN — TRAZODONE HYDROCHLORIDE 50 MG: 50 TABLET ORAL at 21:58

## 2021-06-14 RX ADMIN — NICOTINE POLACRILEX 2 MG: 2 GUM, CHEWING ORAL at 21:04

## 2021-06-14 NOTE — GROUP NOTE
ROBERT  GROUP DOCUMENTATION INDIVIDUAL Group Therapy Note Date: 6/13/2021 Group Start Time: 1 Group End Time: 1945 Group Topic: Reflection/Relaxation SO JADE BEH [facility] 1 ADULT CHEM DEP David Banegas RN 
 
[facility] GROUP DOCUMENTATION GROUP Group Therapy Note Attendees: 3 Attendance: Attended Patient's Goal:  Patient to be able to relax and focus during an activity Interventions/techniques: Informed Follows Directions: Followed directions Interactions: Interacted appropriately Mental Status: Calm Behavior/appearance: Cooperative Goals Achieved: Able to listen to others Lolita De Souza RN

## 2021-06-14 NOTE — BH NOTES
Patient given Trazodone for insomnia, Nicotine gum for nicotine withdrawal, and Vistaril for anxiety per patient request will continue to follow current POC and interventions per policies/protocols.

## 2021-06-14 NOTE — PROGRESS NOTES
[address] [facility] [address]  Inpatient Progress Note     Date of Service: 06/14/21  Hospital Day: 3     Subjective/Interval History   06/14/21    Treatment Team Notes:  Notes reviewed and/or discussed and report that Starr Lopez is a patient with a history of a mood disorder, initially admitted with with impression of her bipolar 2 depression, however as further sessions, being able to be provided with the patient, current working diagnosis is that of a major depressive disorder recurrent without psychotic symptoms. For that reason treatment with citalopram 10 mg every morning has been ordered with the patient being provided with side effects and adverse effects in association to treatment with antidepressant therapy specifically with citalopram, and consent obtained      Patient interview: Starr Lopez was interviewed by this writer today. The patient described that her depression is improving. There has been a positive treatment response since she was admitted to the facility and the specifically she has been able to be provided with the support that she has required. During the evaluation taking her away from her stressors is the reason for which her symptoms of depression are doing better. Nonetheless based upon her history, treatment with antidepressants is indicated with that. This is the reason for which citalopram has been ordered and will be a started this morning. Please see medication orders. Objective     Visit Vitals  BP (!) 106/56 (BP 1 Location: Right arm, BP Patient Position: Sitting)   Pulse 86   Temp 97.7 °F (36.5 °C)   Resp 18   Ht 5' 2\" (1.575 m)   Wt 62.6 kg (138 lb)   SpO2 99%   Breastfeeding No   BMI 25.24 kg/m²     Mild hypotension noted above is asymptomatic. No results found for this or any previous visit (from the past 24 hour(s)). Mental Status Examination     Appearance/Hygiene 23 y.o.  BLACK/ female  Hygiene: Much improved Behavior/Social Relatedness Appropriate   Musculoskeletal Gait/Station: appropriate  Tone (flaccid, cogwheeling, spastic): not assessed  Psychomotor (hyperkinetic, hypokinetic): calm   Involuntary movements (tics, dyskinesias, akathisa, stereotypies): none   Speech   Rate, rhythm, volume, fluency and articulation are appropriate   Mood   less helpless/hopeless   Affect    less irritable   Thought Process Linear and goal directed   Thought Content and Perceptual Disturbances Denies self-injurious behavior (SIB), suicidal ideation (SI), aggressive behavior or homicidal ideation (HI)    Denies auditory and visual hallucinations   Sensorium and Cognition  Grossly intact   Insight  improving   Judgment  improving        Assessment/Plan      Psychiatric Diagnoses:   Patient Active Problem List   Diagnosis Code    Labor without complication U33    Term pregnancy Z34.90    MDD (major depressive disorder), recurrent severe, without psychosis ([facility] F33.2       Medical Diagnoses: Same    Psychosocial and contextual factors: Same    Level of impairment/disability: Moderately severe    1. Citalopram 10 mg every day has been a started. Side effects and adverse effects have been discussed with the patient including the possibility due to her age of her having an increased presence of suicidal thoughts this being associated to individuals between the ages of 25 and 25 being provided treatment with antidepressants. The patient is in agreement to let us or her outpatient psychiatristmental health provider after discharge if she develop any suicidal thoughts. 2.  Reviewed instructions, risks, benefits and side effects of medications  3. Disposition/Discharge Date: self-care/home, possibly tomorrow if stable.     Sita Lombardi MD, [address] [place] [address]  Psychiatry

## 2021-06-14 NOTE — BSMART NOTE
ART THERAPY GROUP PROGRESS NOTE PATIENT SCHEDULED FOR GROUP AT: 13:20 
 
ATTENDANCE: Full PARTICIPATION LEVEL: Participates fully in the art process. ATTENTION LEVEL : Able to focus on task. FOCUS: Goals SYMBOLIC & THEMATIC CONTENT AS NOTED IN IMAGERY: She was dysphoric and compliant. She was invested in the task at hand and her approach was organized and planned out. After completing the art task she wrote a poem reflecting on her journey to goals. Her artwork and poetry had themes of \"trying\" or investment in treatment. Her goal was to reach happiness and to overcome depression and anxiety. When asked about what has been helping with managing anxiety she said, \"nothing, that's why I am here. \" She indicated she wanted to learn more about coping skills.

## 2021-06-14 NOTE — BSMART NOTE
[address] [facility] Biopsychosocial Assessment Current Level of Psychosocial Functioning []Independent 
[]Dependent []Minimal Assist 
 
 
Comments:   
 
Psychosocial High Risk Factors (check all that apply) []Unable to obtain meds []Chronic illness/pain []Substance abuse  
[]Lack of Family Support []Financial stress []Isolation []Inadequate Community Resources 
[]Suicide attempt(s) []Not taking medications []Victim of crime []Developmental Delay 
[]Unable to manage personal needs []Age 72 or older  
[]  Homeless []Rafa transportation []Readmission within 30 days []Unemployment []Traumatic Event Psychiatric Advanced Directive: 
 
Family to involve in treatment: 
 
Sexual Orientation:   
 
Patient Strengths: 
 
Patient Barriers:  
 
Opiate education provided: 
 
Safety plan: 
 
CMHC/MH history: 
 
Plan of Care: 
medication management, group/individual therapies, family meetings, psycho -education, treatment team meetings to assist with stabilization Initial Discharge Plan:   
 
Clinical Summary:

## 2021-06-14 NOTE — BSMART NOTE
ART THERAPY GROUP PROGRESS NOTE PATIENT SCHEDULED FOR GROUP AT: 9:45 ATTENDANCE: 3/4 PARTICIPATION LEVEL: Participates fully in the art process. ATTENTION LEVEL : Able to focus on task. FOCUS: Mindfulness Morning SYMBOLIC & THEMATIC CONTENT AS NOTED IN IMAGERY: Patient was calm and presented with a bright affect. She was observed laughing and socializing with other group members. She was invested in the task at hand and her approach was organized. She was pulled out of group to meet with her doctor before group discussion began.

## 2021-06-15 VITALS
OXYGEN SATURATION: 99 % | WEIGHT: 138 LBS | HEIGHT: 62 IN | BODY MASS INDEX: 25.4 KG/M2 | DIASTOLIC BLOOD PRESSURE: 66 MMHG | RESPIRATION RATE: 18 BRPM | SYSTOLIC BLOOD PRESSURE: 104 MMHG | HEART RATE: 86 BPM | TEMPERATURE: 97.3 F

## 2021-06-15 PROCEDURE — 74011250637 HC RX REV CODE- 250/637: Performed by: PSYCHIATRY & NEUROLOGY

## 2021-06-15 PROCEDURE — 99238 HOSP IP/OBS DSCHRG MGMT 30/<: CPT | Performed by: PSYCHIATRY & NEUROLOGY

## 2021-06-15 RX ORDER — CITALOPRAM 20 MG/1
20 TABLET, FILM COATED ORAL DAILY
Qty: 30 TABLET | Refills: 0 | Status: SHIPPED | OUTPATIENT
Start: 2021-06-16

## 2021-06-15 RX ORDER — HYDROXYZINE PAMOATE 25 MG/1
25 CAPSULE ORAL
Qty: 20 CAPSULE | Refills: 0 | Status: SHIPPED | OUTPATIENT
Start: 2021-06-15 | End: 2021-06-29

## 2021-06-15 RX ORDER — CITALOPRAM 20 MG/1
20 TABLET, FILM COATED ORAL DAILY
Status: DISCONTINUED | OUTPATIENT
Start: 2021-06-16 | End: 2021-06-15 | Stop reason: HOSPADM

## 2021-06-15 RX ORDER — TRAZODONE HYDROCHLORIDE 50 MG/1
50 TABLET ORAL
Qty: 15 TABLET | Refills: 1 | Status: SHIPPED | OUTPATIENT
Start: 2021-06-15

## 2021-06-15 RX ORDER — CITALOPRAM 20 MG/1
20 TABLET, FILM COATED ORAL DAILY
Qty: 30 TABLET | Refills: 0 | Status: SHIPPED | OUTPATIENT
Start: 2021-06-16 | End: 2021-06-15

## 2021-06-15 RX ADMIN — NICOTINE POLACRILEX 2 MG: 2 GUM, CHEWING ORAL at 14:19

## 2021-06-15 RX ADMIN — CITALOPRAM HYDROBROMIDE 10 MG: 10 TABLET ORAL at 08:26

## 2021-06-15 RX ADMIN — NICOTINE POLACRILEX 2 MG: 2 GUM, CHEWING ORAL at 07:52

## 2021-06-15 NOTE — BSMART NOTE
SOCIAL WORK GROUP THERAPY PROGRESS NOTE Group Time:  10:15am   1:15pm 
 
Group Topic:  Coping Skills    C D Issues Group Participation:   
 
Pt moderately involved during group discussion but remained attentive. Affect flat, mood somewhat dysphoric. Read parts of handout. Reviewed strategies to keep a \"Journal\" for moods, cognitions, behavior & outcome. Did handout on  x25 ways to be better in managing \"anxiety\". Differences between Assertive, Aggressive & Non-Assertive Behaviors no concerns

## 2021-06-15 NOTE — PROGRESS NOTES
Problem: Suicide  Goal: *STG: Remains safe in hospital  Description: AEB pt not harming self or others for the next 6 days while in the hospital  Outcome: Progressing Towards Goal  Goal: *STG/LTG: Complies with medication therapy  Description: AEB pt taking all scheduled medications for the next 6 days while in the hospital  Outcome: Progressing Towards Goal     Problem: Falls - Risk of  Goal: *Absence of Falls  Description: Document [place] Fall Risk and appropriate interventions in the flowsheet. Patient will remain fall free during hospital stay   Outcome: Progressing Towards Goal  Note: Fall Risk Interventions:            Medication Interventions: Teach patient to arise slowly          Pt presents with dull affect, anxious mood, preoccupied thought process. Pt has been sociable on the unit, playing games with her peers. Pt is participative in groups and is adherent with unit guidelines. Pt denies SI/HI at this time. Pt is medication compliant. Will continue to monitor.

## 2021-06-15 NOTE — DISCHARGE INSTRUCTIONS
***IMPORTANT NUMBERS***        [address] [facility]        [phone]    [address] [facility]       [phone]    Suicide Prevention     [phone]          Patient is alert x3 and ambulatory. Patient has copy of discharge papers with follow up appt. Patient's prescriptions were filled by outpatient pharmacy prior to discharge. Patient has form to return to work dated 06/18/2021 Patient has all personal belongings and has signed form. Patient denies thoughts of self harm or harm to others at this time. Patient armband taken and shredded. Patient discharged to home address with Godmother providing transportation.      Patient has an appointment with Cora Dave NP on 6/23/21 @ 2:15pm  [facility]  [address] [place] [address]   [phone]

## 2021-06-15 NOTE — BH NOTES
Pt received PRN Vistaril for anxiety 4/10. Pt also received PRN Trazodone for sleep. Will continue to monitor for effectiveness.

## 2021-06-15 NOTE — BH NOTES
Patient has been in day area most of day shift. Patient denies thoughts of self harm or harm to others at this time. Patient has eaten all meals and snacks and has been compliant with scheduled medications. Patient has received PRN Nicorette gum twice this shift. Patient completed safety plan prior to discharge and plan included with discharge paperwork. Patient armband taken prior to leaving unit. Patient has all personal belongings and has signed form. Patient has copy of discharge paperwork. Patient's prescriptions were filled by outpatient pharmacy prior to discharge. Patient discharged to home address with Godmother providing transportation. Patient escorted to main entrance by supervisor once transportation had arrived.

## 2021-06-17 ENCOUNTER — TELEPHONE (OUTPATIENT)
Dept: ADDICTION MEDICINE | Age: 26
End: 2021-06-17

## 2021-06-17 NOTE — TELEPHONE ENCOUNTER
This writer completed follow-up call at [phone] to phone number [phone]. Patient states that she is doing great and confirms plans to follow-up with Ines Tovar at [facility].

## 2021-06-25 NOTE — DISCHARGE SUMMARY
[address] [facility]    Name:  Clair Waterman  MR#:   [id number]  :  1995  ACCOUNT #:  [de-identified]  ADMIT DATE:  2021  DISCHARGE DATE:  06/15/2021      SIGNIFICANT FINDINGS:  History and physical exam was performed shortly after the patient was admitted to the facility, attention invited to this document, a place where the reasons for which the patient presented herself to [facility], the findings upon her being examined there and so the reason for which she after being presented to the physician on-call, she was then admitted to the service of the undersigned. When the undersigned met with the patient, she described a history of depression which appeared to have been long-lasting. It began on or about when she was a teenager, mostly getting worse when she was around 25years old. At that time, she was admitted to a psychiatric facility in [place] due to an intentional overdose with sleeping pills in an attempt to end her life. She was placed on Zoloft, which the patient took for only 30 days since she felt \"numb with it. \"  This was the reason she said for which she stopped taking the medication after a month's treatment. She denied any further admissions; however, she does report a history of self-mutilation with cutting behaviors from the ages 25 to 26. She described that she takes razors apart to cut her legs. During the admission, she mentioned that while she was in [place], she was given a diagnosis of \"manic depression\" and so the reason for which questions have been raised due to the patient's history not only of depression but also some mood lability with irritability. These were the reasons for which she was given the above-mentioned mood disorder diagnosis. The patient described multiple stressors. She has three children with three different fathers. The only one supportive is the father of her youngest child she says.   She is working in one of the [email or web address] and that is obviously very stressful to her, she says. On the positive side, she was suicidal prior to admission and decided to come to the hospital for evaluation and treatment. Physical exam was performed at the time of the patient's evaluation in the emergency room. She had a blood pressure of 134/89 and since then, blood pressure had come down with the following day, when the patient was examined by the undersigned, she was improved to 105/67 with a heart rate of 80. Multiple labs have been performed while she was in the emergency department including a CBC with differential that showed completely normal test results. A BMP showed excellent results with a sodium of 139, potassium 3.8, chloride 107, BUN 11, creatinine 0.51, and blood sugar of 84. Estimated GFR above 60 mL/min. Pregnancy test negative. Alcohol blood levels below 3. Urine drug screen was negative; however, she does smoke cannabis on occasion, she says. COVID-19 rapid test showed negative result from a nasopharyngeal testing. COURSE DURING HOSPITALIZATION AND TREATMENT:  Admitted to the adult CD program, the patient was seen on daily individual psychiatric basis and was also referred to the groups within context of the program.  A rather depressed woman with a history as stated above with multiple stressors and she failed to show any evidence not only historically but also by the mental status performed upon admission of actual mood lability and specifically no evidence of a bipolar illness. She described mostly a history of recurrent symptoms of depression, at times with psychotic symptoms and self-mutilating behaviors, which could be related to the patient's Axis II diagnosis; however, in general she did not show other than the irritability, a history of hypomania or jason.   For that reason, she was given a diagnosis of recurrent major depressive disorder without psychotic symptoms and was treated as such. The patient was prescribed during this hospitalization with citalopram which she tolerated very well in addition to her being prescribed Vistaril 25 mg every 6 hours as needed for anxiety and trazodone 50 mg as needed at bedtime due to her history of insomnia. The patient tolerated these medications very well. In addition to her being provided with a physical exam at the emergency department, while she was in our facility, Maverick Farnsworth, physician assistant, performed a physical exam that basically confirmed the findings of the patient's examination at the time of her evaluation at the emergency room. No evidence of acute findings were described. In the facility, the patient had several lab tests performed including a hepatic function panel that showed completely normal test results. We considered the possibility of prescribing the patient with an atypical antipsychotic to provide some mood stabilization for mostly antidepressant augmentation and for that reason, the patient was ordered to have a lipid panel that showed triglycerides of 79, total cholesterol 206, HDL 66, cholesterol-HDL ratio 3.1 and LDL of 124.2. A1c normal at 5.4%. For completeness, a TSH was performed and showed normal results of 1.37 International Units/mL. No further tests were performed in the facility. Even though we considered the possibility of prescribing the patient with an atypical as I have above stated, she began to show positive improvement by her very appropriate participation in group therapy sessions and also specifically individually when she met with the undersigned. For that reason, it was decided to maintain the prescription of citalopram without having to be prescribed with an atypical antipsychotic.   She did require treatment with Vistaril on occasion as she was found to be anxious; however, we expect that if she continues to take prescribed treatment with citalopram, her anxiety will improve to the point of remission or at least to the point in which her symptoms are well controlled. We also considered that the patient's insomnia will improve in the same manner. CONDITION UPON DISCHARGE:  Not suicidal or homicidal, not psychotic, showing no evidence of cognitive impairment, and agreeable to outpatient care. FINAL DIAGNOSES:  AXIS I:  Major depressive disorder, recurrent, without psychotic symptoms. Cannabis use disorder, moderate. AXIS II:  Deferred. AXIS III:  History of self-inflicted lacerations to both thighs, last one remotely done. DISPOSITION:  The patient was discharged with outpatient treatment referrals, attention invited to the patient's  discharge summary note which is self-explanatory. PRESCRIPTIONS UPON DISCHARGE:  The patient was discharged on the following medications for 30 days without refills including Celexa 20 mg tablets, to continue half a tablet for 5-7 more days and then one tablet every day; trazodone 50 mg tablets, #15 with one refill, to take one daily at bedtime as needed for insomnia; and Vistaril 25 mg capsules, #20 without refills, to take one every 4-6 hours as needed for breakthrough anxiety. No evidence of medications-related side effects upon discharge noted. PROGNOSIS:  Good with outpatient treatment compliance.         Aimee Betancourt MD, LFAPA      [id number]  D:  06/24/2021 20:12  T:  06/25/2021 6:48  JOB #:  [id number]